# Patient Record
Sex: FEMALE | Race: OTHER | Employment: PART TIME | URBAN - METROPOLITAN AREA
[De-identification: names, ages, dates, MRNs, and addresses within clinical notes are randomized per-mention and may not be internally consistent; named-entity substitution may affect disease eponyms.]

---

## 2017-01-04 ENCOUNTER — TELEPHONE (OUTPATIENT)
Dept: FAMILY MEDICINE CLINIC | Age: 42
End: 2017-01-04

## 2017-01-04 RX ORDER — LOSARTAN POTASSIUM AND HYDROCHLOROTHIAZIDE 25; 100 MG/1; MG/1
1 TABLET ORAL DAILY
Qty: 30 TAB | Refills: 5 | Status: SHIPPED | OUTPATIENT
Start: 2017-01-04 | End: 2017-08-02 | Stop reason: SDUPTHER

## 2017-01-04 NOTE — TELEPHONE ENCOUNTER
Pt called. Pt wants lisinopril changed to something different because of the side affects. Side affects cold for 3wks and rashes and swelling in tongue and made it feel funny. Call pt at [phone].

## 2017-01-04 NOTE — TELEPHONE ENCOUNTER
Please advise pt that alternate medication has been sent to her pharmacy on file, please follow up in 2-3 weeks for BP check due to medication change.

## 2017-07-28 RX ORDER — ALBUTEROL SULFATE 108 UG/1
AEROSOL, METERED RESPIRATORY (INHALATION)
Qty: 7 INHALER | Refills: 0 | Status: SHIPPED | OUTPATIENT
Start: 2017-07-28 | End: 2017-10-08 | Stop reason: SDUPTHER

## 2017-08-02 RX ORDER — ONDANSETRON 4 MG/1
TABLET, ORALLY DISINTEGRATING ORAL
Qty: 15 TAB | Refills: 0 | Status: SHIPPED | OUTPATIENT
Start: 2017-08-02 | End: 2017-10-08 | Stop reason: SDUPTHER

## 2017-08-02 RX ORDER — LOSARTAN POTASSIUM AND HYDROCHLOROTHIAZIDE 25; 100 MG/1; MG/1
TABLET ORAL
Qty: 30 TAB | Refills: 0 | Status: SHIPPED | OUTPATIENT
Start: 2017-08-02 | End: 2017-10-08 | Stop reason: SDUPTHER

## 2017-10-09 RX ORDER — LOSARTAN POTASSIUM AND HYDROCHLOROTHIAZIDE 25; 100 MG/1; MG/1
TABLET ORAL
Qty: 30 TAB | Refills: 0 | Status: SHIPPED | OUTPATIENT
Start: 2017-10-09 | End: 2018-04-25 | Stop reason: SDUPTHER

## 2017-10-09 RX ORDER — ALBUTEROL SULFATE 108 UG/1
AEROSOL, METERED RESPIRATORY (INHALATION)
Qty: 6 INHALER | Refills: 0 | Status: SHIPPED | OUTPATIENT
Start: 2017-10-09 | End: 2018-04-25 | Stop reason: SDUPTHER

## 2017-10-10 RX ORDER — ONDANSETRON 4 MG/1
TABLET, ORALLY DISINTEGRATING ORAL
Qty: 15 TAB | Refills: 0 | Status: SHIPPED | OUTPATIENT
Start: 2017-10-10 | End: 2018-04-25 | Stop reason: ALTCHOICE

## 2018-04-25 ENCOUNTER — OFFICE VISIT (OUTPATIENT)
Dept: FAMILY MEDICINE CLINIC | Age: 43
End: 2018-04-25

## 2018-04-25 VITALS
OXYGEN SATURATION: 97 % | RESPIRATION RATE: 18 BRPM | HEIGHT: 62 IN | WEIGHT: 293 LBS | BODY MASS INDEX: 53.92 KG/M2 | HEART RATE: 85 BPM | TEMPERATURE: 97.9 F | SYSTOLIC BLOOD PRESSURE: 170 MMHG | DIASTOLIC BLOOD PRESSURE: 95 MMHG

## 2018-04-25 DIAGNOSIS — R11.0 NAUSEA: ICD-10-CM

## 2018-04-25 DIAGNOSIS — F41.9 ANXIETY: ICD-10-CM

## 2018-04-25 DIAGNOSIS — Z12.39 SCREENING FOR MALIGNANT NEOPLASM OF BREAST: ICD-10-CM

## 2018-04-25 DIAGNOSIS — I10 ESSENTIAL HYPERTENSION: Primary | ICD-10-CM

## 2018-04-25 DIAGNOSIS — M79.7 FIBROMYALGIA: ICD-10-CM

## 2018-04-25 DIAGNOSIS — G47.00 INSOMNIA, UNSPECIFIED TYPE: ICD-10-CM

## 2018-04-25 PROBLEM — E66.01 OBESITY, MORBID (HCC): Status: ACTIVE | Noted: 2018-04-25

## 2018-04-25 RX ORDER — CYCLOBENZAPRINE HCL 10 MG
10 TABLET ORAL
Qty: 90 TAB | Refills: 1 | Status: SHIPPED | OUTPATIENT
Start: 2018-04-25 | End: 2019-05-07 | Stop reason: SDUPTHER

## 2018-04-25 RX ORDER — ALBUTEROL SULFATE 90 UG/1
AEROSOL, METERED RESPIRATORY (INHALATION)
Qty: 6 INHALER | Refills: 2 | Status: SHIPPED | OUTPATIENT
Start: 2018-04-25 | End: 2019-05-07 | Stop reason: SDUPTHER

## 2018-04-25 RX ORDER — LORAZEPAM 0.5 MG/1
0.5 TABLET ORAL
Qty: 45 TAB | Refills: 2 | Status: SHIPPED | OUTPATIENT
Start: 2018-04-25 | End: 2019-05-07 | Stop reason: SDUPTHER

## 2018-04-25 RX ORDER — LOSARTAN POTASSIUM AND HYDROCHLOROTHIAZIDE 25; 100 MG/1; MG/1
TABLET ORAL
Qty: 90 TAB | Refills: 3 | Status: SHIPPED | OUTPATIENT
Start: 2018-04-25 | End: 2019-05-07 | Stop reason: SDUPTHER

## 2018-04-25 RX ORDER — IBUPROFEN 800 MG/1
800 TABLET ORAL
Qty: 90 TAB | Refills: 1 | Status: SHIPPED | OUTPATIENT
Start: 2018-04-25 | End: 2019-05-07 | Stop reason: SDUPTHER

## 2018-04-25 RX ORDER — DULOXETIN HYDROCHLORIDE 60 MG/1
60 CAPSULE, DELAYED RELEASE ORAL 2 TIMES DAILY
COMMUNITY
End: 2018-04-25 | Stop reason: SDUPTHER

## 2018-04-25 RX ORDER — ONDANSETRON 4 MG/1
TABLET, ORALLY DISINTEGRATING ORAL
Qty: 15 TAB | Refills: 2 | Status: SHIPPED | OUTPATIENT
Start: 2018-04-25 | End: 2018-10-17 | Stop reason: SDUPTHER

## 2018-04-25 RX ORDER — DULOXETIN HYDROCHLORIDE 60 MG/1
60 CAPSULE, DELAYED RELEASE ORAL 2 TIMES DAILY
Qty: 180 CAP | Refills: 3 | Status: SHIPPED | OUTPATIENT
Start: 2018-04-25 | End: 2019-05-07 | Stop reason: SDUPTHER

## 2018-04-25 NOTE — MR AVS SNAPSHOT
[address] [address] 
[phone] Patient: Yaneth Marley MRN: [id number] :1975 Visit Information Date & Time Provider Department Dept. Phone Encounter #  
 2018 10:20 AM Jordana Aguilar MD [address] [facility] [phone] [id number] Upcoming Health Maintenance Date Due DTaP/Tdap/Td series (1 - Tdap) 1995 PAP AKA CERVICAL CYTOLOGY 2018 Influenza Age 5 to Adult 2018* *Topic was postponed. The date shown is not the original due date. Allergies as of 2018  Review Complete On: 2018 By: Jordana Aguilar MD  
 No Known Allergies Current Immunizations  Reviewed on 3/21/2012 Name Date PPD 3/21/2012 Not reviewed this visit You Were Diagnosed With   
  
 Codes Comments Essential hypertension    -  Primary ICD-10-CM: I10 
ICD-9-CM: 401.9 Insomnia, unspecified type     ICD-10-CM: G47.00 ICD-9-CM: 780.52 Anxiety     ICD-10-CM: F41.9 ICD-9-CM: 300.00 Fibromyalgia     ICD-10-CM: M79.7 ICD-9-CM: 729.1 Nausea     ICD-10-CM: R11.0 ICD-9-CM: 787.02 Screening for malignant neoplasm of breast     ICD-10-CM: Z12.31 
ICD-9-CM: V76.10 Vitals BP Pulse Temp Resp Height(growth percentile) Weight(growth percentile) (!) 170/95 (BP 1 Location: Left arm, BP Patient Position: Sitting) 85 97.9 °F (36.6 °C) (Oral) 18 5' 2\" (1.575 m) 310 lb (140.6 kg) LMP SpO2 BMI OB Status Smoking Status 2018 (Exact Date) 97% 56.7 kg/m2 Having regular periods Never Smoker Vitals History BMI and BSA Data Body Mass Index Body Surface Area 56.7 kg/m 2 2.48 m 2 Preferred Pharmacy Pharmacy Name Phone [facility] [address] [phone] Your Updated Medication List  
  
   
This list is accurate as of 18 11:11 AM.  Always use your most recent med list.  
  
  
  
  
 albuterol 90 mcg/actuation inhaler Commonly known as:  PROVENTIL HFA INHALE TWO PUFFS BY MOUTH EVERY 4 HOURS AS NEEDED FOR WHEEZING FOR  UP  TO  360  DAYS  
  
 cyclobenzaprine 10 mg tablet Commonly known as:  FLEXERIL Take 1 Tab by mouth nightly. DULoxetine 60 mg capsule Commonly known as:  CYMBALTA Take 1 Cap by mouth two (2) times a day. ibuprofen 800 mg tablet Commonly known as:  MOTRIN Take 1 Tab by mouth every eight (8) hours as needed for Pain. LORazepam 0.5 mg tablet Commonly known as:  ATIVAN Take 1 Tab by mouth two (2) times daily as needed for Anxiety (or sleep). Must last 30 days  
  
 losartan-hydroCHLOROthiazide 100-25 mg per tablet Commonly known as:  HYZAAR  
TAKE ONE TABLET BY MOUTH ONCE DAILY  
  
 ondansetron 4 mg disintegrating tablet Commonly known as:  ZOFRAN ODT  
DISSOLVE ONE TABLET ON TONGUE  EVERY 8 HOURS AS NEEDED Prescriptions Printed Refills LORazepam (ATIVAN) 0.5 mg tablet 2 Sig: Take 1 Tab by mouth two (2) times daily as needed for Anxiety (or sleep). Must last 30 days Class: Print Route: Oral  
  
Prescriptions Sent to Pharmacy Refills DULoxetine (CYMBALTA) 60 mg capsule 3 Sig: Take 1 Cap by mouth two (2) times a day. Class: Normal  
 Pharmacy: [facility] [address] Ph #: [phone] Route: Oral  
 losartan-hydroCHLOROthiazide (HYZAAR) 100-25 mg per tablet 3 Sig: TAKE ONE TABLET BY MOUTH ONCE DAILY Class: Normal  
 Pharmacy: [facility] [address] Ph #: [phone]  
 ibuprofen (MOTRIN) 800 mg tablet 1 Sig: Take 1 Tab by mouth every eight (8) hours as needed for Pain. Class: Normal  
 Pharmacy: [facility] [address] Ph #: [phone]  Route: Oral  
 albuterol (PROVENTIL HFA) 90 mcg/actuation inhaler 2  
 Sig: INHALE TWO PUFFS BY MOUTH EVERY 4 HOURS AS NEEDED FOR WHEEZING FOR  UP  TO  360  DAYS Class: Normal  
 Pharmacy: [facility] [address] CAROLINE MIKE [facility], [address] [place] [address] Ph #: [phone]  
 cyclobenzaprine (FLEXERIL) 10 mg tablet 1 Sig: Take 1 Tab by mouth nightly. Class: Normal  
 Pharmacy: [facility] [address] CAR[address] MIKE [facility], [address] [place] [address] Ph #: [phone] Route: Oral  
 ondansetron (ZOFRAN ODT) 4 mg disintegrating tablet 2 Sig: DISSOLVE ONE TABLET ON TONGUE  EVERY 8 HOURS AS NEEDED Class: Normal  
 Pharmacy: [facility] [address] CAR[address] MIKE [facility], [address] [place] [address] Ph #: [phone] We Performed the Following CBC WITH AUTOMATED DIFF [91599 CPT(R)] HEMOGLOBIN A1C WITH EAG [16725 CPT(R)] LIPID PANEL [67842 CPT(R)] METABOLIC PANEL, COMPREHENSIVE [51677 CPT(R)] TSH 3RD GENERATION [04344 CPT(R)] VITAMIN D, 25 HYDROXY O2014347 CPT(R)] To-Do List   
 04/25/2018 Imaging:  [facility] MAMMO BI SCREENING INCL CAD Patient Instructions Body Mass Index: Care Instructions Your Care Instructions Body mass index (BMI) can help you see if your weight is raising your risk for health problems. It uses a formula to compare how much you weigh with how tall you are. · A BMI lower than 18.5 is considered underweight. · A BMI between 18.5 and 24.9 is considered healthy. · A BMI between 25 and 29.9 is considered overweight. A BMI of 30 or higher is considered obese. If your BMI is in the normal range, it means that you have a lower risk for weight-related health problems. If your BMI is in the overweight or obese range, you may be at increased risk for weight-related health problems, such as high blood pressure, heart disease, stroke, arthritis or joint pain, and diabetes.  If your BMI is in the underweight range, you may be at increased risk for health problems such as fatigue, lower protection (immunity) against illness, muscle loss, bone loss, hair loss, and hormone problems. BMI is just one measure of your risk for weight-related health problems. You may be at higher risk for health problems if you are not active, you eat an unhealthy diet, or you drink too much alcohol or use tobacco products. Follow-up care is a key part of your treatment and safety. Be sure to make and go to all appointments, and call your doctor if you are having problems. It's also a good idea to know your test results and keep a list of the medicines you take. How can you care for yourself at home? · Practice healthy eating habits. This includes eating plenty of fruits, vegetables, whole grains, lean protein, and low-fat dairy. · If your doctor recommends it, get more exercise. Walking is a good choice. Bit by bit, increase the amount you walk every day. Try for at least 30 minutes on most days of the week. · Do not smoke. Smoking can increase your risk for health problems. If you need help quitting, talk to your doctor about stop-smoking programs and medicines. These can increase your chances of quitting for good. · Limit alcohol to 2 drinks a day for men and 1 drink a day for women. Too much alcohol can cause health problems. If you have a BMI higher than 25 · Your doctor may do other tests to check your risk for weight-related health problems. This may include measuring the distance around your waist. A waist measurement of more than 40 inches in men or 35 inches in women can increase the risk of weight-related health problems. · Talk with your doctor about steps you can take to stay healthy or improve your health. You may need to make lifestyle changes to lose weight and stay healthy, such as changing your diet and getting regular exercise. If you have a BMI lower than 18.5 · Your doctor may do other tests to check your risk for health problems.  
· Talk with your doctor about steps you can take to stay healthy or improve your health. You may need to make lifestyle changes to gain or maintain weight and stay healthy, such as getting more healthy foods in your diet and doing exercises to build muscle. Where can you learn more? Go to http://britany-mike.info/. Enter S176 in the search box to learn more about \"Body Mass Index: Care Instructions. \" Current as of: October 13, 2016 Content Version: 11.4 © 5894-9035 [employer]. Care instructions adapted under license by [employer] (which disclaims liability or warranty for this information). If you have questions about a medical condition or this instruction, always ask your healthcare professional. [address] any warranty or liability for your use of this information. Introducing hospitals & HEALTH SERVICES! [facility] introduces [employer] patient portal. Now you can access parts of your medical record, email your doctor's office, and request medication refills online. 1. In your internet browser, go to [email or web address] 2. Click on the First Time User? Click Here link in the Sign In box. You will see the New Member Sign Up page. 3. Enter your [employer] Access Code exactly as it appears below. You will not need to use this code after youve completed the sign-up process. If you do not sign up before the expiration date, you must request a new code. · [employer] Access Code: [address] Expires: 7/24/2018 11:11 AM 
 
4. Enter the last four digits of your Social Security Number (xxxx) and Date of Birth (mm/dd/yyyy) as indicated and click Submit. You will be taken to the next sign-up page. 5. Create a [employer] ID. This will be your [employer] login ID and cannot be changed, so think of one that is secure and easy to remember. 6. Create a [employer] password. You can change your password at any time. 7. Enter your Password Reset Question and Answer.  This can be used at a later time if you forget your password. 8. Enter your e-mail address. You will receive e-mail notification when new information is available in [address] 9. Click Sign Up. You can now view and download portions of your medical record. 10. Click the Download Summary menu link to download a portable copy of your medical information. If you have questions, please visit the Frequently Asked Questions section of the [employer] website. Remember, [employer] is NOT to be used for urgent needs. For medical emergencies, dial 911. Now available from your iPhone and Android! Please provide this summary of care documentation to your next provider. Your primary care clinician is listed as MOISÉS JOE. If you have any questions after today's visit, please call [phone].

## 2018-04-25 NOTE — PATIENT INSTRUCTIONS
Body Mass Index: Care Instructions  Your Care Instructions    Body mass index (BMI) can help you see if your weight is raising your risk for health problems. It uses a formula to compare how much you weigh with how tall you are. · A BMI lower than 18.5 is considered underweight. · A BMI between 18.5 and 24.9 is considered healthy. · A BMI between 25 and 29.9 is considered overweight. A BMI of 30 or higher is considered obese. If your BMI is in the normal range, it means that you have a lower risk for weight-related health problems. If your BMI is in the overweight or obese range, you may be at increased risk for weight-related health problems, such as high blood pressure, heart disease, stroke, arthritis or joint pain, and diabetes. If your BMI is in the underweight range, you may be at increased risk for health problems such as fatigue, lower protection (immunity) against illness, muscle loss, bone loss, hair loss, and hormone problems. BMI is just one measure of your risk for weight-related health problems. You may be at higher risk for health problems if you are not active, you eat an unhealthy diet, or you drink too much alcohol or use tobacco products. Follow-up care is a key part of your treatment and safety. Be sure to make and go to all appointments, and call your doctor if you are having problems. It's also a good idea to know your test results and keep a list of the medicines you take. How can you care for yourself at home? · Practice healthy eating habits. This includes eating plenty of fruits, vegetables, whole grains, lean protein, and low-fat dairy. · If your doctor recommends it, get more exercise. Walking is a good choice. Bit by bit, increase the amount you walk every day. Try for at least 30 minutes on most days of the week. · Do not smoke. Smoking can increase your risk for health problems. If you need help quitting, talk to your doctor about stop-smoking programs and medicines. These can increase your chances of quitting for good. · Limit alcohol to 2 drinks a day for men and 1 drink a day for women. Too much alcohol can cause health problems. If you have a BMI higher than 25  · Your doctor may do other tests to check your risk for weight-related health problems. This may include measuring the distance around your waist. A waist measurement of more than 40 inches in men or 35 inches in women can increase the risk of weight-related health problems. · Talk with your doctor about steps you can take to stay healthy or improve your health. You may need to make lifestyle changes to lose weight and stay healthy, such as changing your diet and getting regular exercise. If you have a BMI lower than 18.5  · Your doctor may do other tests to check your risk for health problems. · Talk with your doctor about steps you can take to stay healthy or improve your health. You may need to make lifestyle changes to gain or maintain weight and stay healthy, such as getting more healthy foods in your diet and doing exercises to build muscle. Where can you learn more? Go to http://britany-mike.info/. Enter S176 in the search box to learn more about \"Body Mass Index: Care Instructions. \"  Current as of: October 13, 2016  Content Version: 11.4  © 8131-5581 [employer]wise[employer]. Care instructions adapted under license by [employer] (which disclaims liability or warranty for this information). If you have questions about a medical condition or this instruction, always ask your healthcare professional. [address] any warranty or liability for your use of this information.

## 2018-04-25 NOTE — PROGRESS NOTES
Pt here requesting refills on all RXs. Reports she has been off of BP medication since October. Pt is fasting this AM for lab work. C/o ongoing back pain x several years. Pt reports Cymbalta helps with back and diffuse joint pain. Subjective: (As above and below)     Chief Complaint   Patient presents with    Medication Refill     she is a 36y.o. year old female who presents for evaluation. Reviewed PmHx, RxHx, FmHx, SocHx, AllgHx and updated in chart. Review of Systems - negative except as listed above    Objective:     Vitals:    04/25/18 1037   BP: (!) 170/95   Pulse: 85   Resp: 18   Temp: 97.9 °F (36.6 °C)   TempSrc: Oral   SpO2: 97%   Weight: 310 lb (140.6 kg)   Height: 5' 2\" (1.575 m)     Physical Examination: General appearance - alert, well appearing, and in no distress  Mental status - normal mood, behavior, speech, dress, motor activity, and thought processes  Mouth - mucous membranes moist, pharynx normal without lesions  Chest - clear to auscultation, no wheezes, rales or rhonchi, symmetric air entry  Heart - normal rate, regular rhythm, normal S1, S2, no murmurs, rubs, clicks or gallops  Musculoskeletal - no joint tenderness, deformity or swelling  Extremities - peripheral pulses normal, no pedal edema, no clubbing or cyanosis    Assessment/ Plan:   1. Insomnia, unspecified type  -well controlled, reviewed as needed use  - LORazepam (ATIVAN) 0.5 mg tablet; Take 1 Tab by mouth two (2) times daily as needed for Anxiety (or sleep). Must last 30 days  Dispense: 45 Tab; Refill: 2    2. Anxiety  -stable  - LORazepam (ATIVAN) 0.5 mg tablet; Take 1 Tab by mouth two (2) times daily as needed for Anxiety (or sleep). Must last 30 days  Dispense: 45 Tab; Refill: 2    3. Fibromyalgia  - DULoxetine (CYMBALTA) 60 mg capsule; Take 1 Cap by mouth two (2) times a day. Dispense: 180 Cap; Refill: 3  - ibuprofen (MOTRIN) 800 mg tablet; Take 1 Tab by mouth every eight (8) hours as needed for Pain.   Dispense: 90 Tab; Refill: 1  - cyclobenzaprine (FLEXERIL) 10 mg tablet; Take 1 Tab by mouth nightly. Dispense: 90 Tab; Refill: 1    4. Essential hypertension  -RESUME MEDICATION IMMEDIATELY   - losartan-hydroCHLOROthiazide (HYZAAR) 100-25 mg per tablet; TAKE ONE TABLET BY MOUTH ONCE DAILY  Dispense: 90 Tab; Refill: 3  - METABOLIC PANEL, COMPREHENSIVE  - CBC WITH AUTOMATED DIFF  - LIPID PANEL  - TSH 3RD GENERATION  - HEMOGLOBIN A1C WITH EAG  - VITAMIN D, 25 HYDROXY    5. Nausea  - ondansetron (ZOFRAN ODT) 4 mg disintegrating tablet; DISSOLVE ONE TABLET ON TONGUE  EVERY 8 HOURS AS NEEDED  Dispense: 15 Tab; Refill: 2    6. Screening for malignant neoplasm of breast  - [facility] MAMMOGRAM SCREENING DIGITAL BILAT; Future     Follow-up Disposition: As needed  I have discussed the diagnosis with the patient and the intended plan as seen in the above orders. The patient has received an after-visit summary and questions were answered concerning future plans.      Medication Side Effects and Warnings were discussed with patient: yes  Patient Labs were reviewed: yes  Patient Past Records were reviewed:  yes    Mesfin Wilcox M.D.

## 2018-04-26 LAB
25(OH)D3+25(OH)D2 SERPL-MCNC: 14.8 NG/ML (ref 30–100)
ALBUMIN SERPL-MCNC: 3.7 G/DL (ref 3.5–5.5)
ALBUMIN/GLOB SERPL: 1.2 {RATIO} (ref 1.2–2.2)
ALP SERPL-CCNC: 79 IU/L (ref 39–117)
ALT SERPL-CCNC: 13 IU/L (ref 0–32)
AST SERPL-CCNC: 12 IU/L (ref 0–40)
BASOPHILS # BLD AUTO: 0 X10E3/UL (ref 0–0.2)
BASOPHILS NFR BLD AUTO: 0 %
BILIRUB SERPL-MCNC: 0.2 MG/DL (ref 0–1.2)
BUN SERPL-MCNC: 9 MG/DL (ref 6–24)
BUN/CREAT SERPL: 15 (ref 9–23)
CALCIUM SERPL-MCNC: 9.1 MG/DL (ref 8.7–10.2)
CHLORIDE SERPL-SCNC: 99 MMOL/L (ref 96–106)
CHOLEST SERPL-MCNC: 176 MG/DL (ref 100–199)
CO2 SERPL-SCNC: 26 MMOL/L (ref 18–29)
CREAT SERPL-MCNC: 0.61 MG/DL (ref 0.57–1)
EOSINOPHIL # BLD AUTO: 0.2 X10E3/UL (ref 0–0.4)
EOSINOPHIL NFR BLD AUTO: 2 %
ERYTHROCYTE [DISTWIDTH] IN BLOOD BY AUTOMATED COUNT: 16.6 % (ref 12.3–15.4)
EST. AVERAGE GLUCOSE BLD GHB EST-MCNC: 126 MG/DL
GFR SERPLBLD CREATININE-BSD FMLA CKD-EPI: 111 ML/MIN/1.73
GFR SERPLBLD CREATININE-BSD FMLA CKD-EPI: 128 ML/MIN/1.73
GLOBULIN SER CALC-MCNC: 3.1 G/DL (ref 1.5–4.5)
GLUCOSE SERPL-MCNC: 101 MG/DL (ref 65–99)
HBA1C MFR BLD: 6 % (ref 4.8–5.6)
HCT VFR BLD AUTO: 38.4 % (ref 34–46.6)
HDLC SERPL-MCNC: 48 MG/DL
HGB BLD-MCNC: 12 G/DL (ref 11.1–15.9)
IMM GRANULOCYTES # BLD: 0 X10E3/UL (ref 0–0.1)
IMM GRANULOCYTES NFR BLD: 0 %
INTERPRETATION, 910389: NORMAL
LDLC SERPL CALC-MCNC: 103 MG/DL (ref 0–99)
LYMPHOCYTES # BLD AUTO: 3.7 X10E3/UL (ref 0.7–3.1)
LYMPHOCYTES NFR BLD AUTO: 37 %
MCH RBC QN AUTO: 23.7 PG (ref 26.6–33)
MCHC RBC AUTO-ENTMCNC: 31.3 G/DL (ref 31.5–35.7)
MCV RBC AUTO: 76 FL (ref 79–97)
MONOCYTES # BLD AUTO: 0.7 X10E3/UL (ref 0.1–0.9)
MONOCYTES NFR BLD AUTO: 8 %
NEUTROPHILS # BLD AUTO: 5.2 X10E3/UL (ref 1.4–7)
NEUTROPHILS NFR BLD AUTO: 53 %
PLATELET # BLD AUTO: 347 X10E3/UL (ref 150–379)
POTASSIUM SERPL-SCNC: 4.6 MMOL/L (ref 3.5–5.2)
PROT SERPL-MCNC: 6.8 G/DL (ref 6–8.5)
RBC # BLD AUTO: 5.06 X10E6/UL (ref 3.77–5.28)
SODIUM SERPL-SCNC: 139 MMOL/L (ref 134–144)
TRIGL SERPL-MCNC: 126 MG/DL (ref 0–149)
TSH SERPL DL<=0.005 MIU/L-ACNC: 1.02 UIU/ML (ref 0.45–4.5)
VLDLC SERPL CALC-MCNC: 25 MG/DL (ref 5–40)
WBC # BLD AUTO: 9.8 X10E3/UL (ref 3.4–10.8)

## 2018-04-26 NOTE — PROGRESS NOTES
Increase in risk for diabetes, please closely monitor diet and increase exercise   Vitamin D is slightly low, please take a daily supplement of at least 2000 IU (international units) daily. All other labs are within normal limits. Recheck in 6 months  Please inform.

## 2018-05-01 ENCOUNTER — DOCUMENTATION ONLY (OUTPATIENT)
Dept: FAMILY MEDICINE CLINIC | Age: 43
End: 2018-05-01

## 2018-07-22 DIAGNOSIS — G47.00 INSOMNIA, UNSPECIFIED TYPE: ICD-10-CM

## 2018-07-22 DIAGNOSIS — F41.9 ANXIETY: ICD-10-CM

## 2018-07-22 RX ORDER — LORAZEPAM 0.5 MG/1
TABLET ORAL
Qty: 45 TAB | Refills: 0 | OUTPATIENT
Start: 2018-07-22

## 2018-10-17 DIAGNOSIS — R11.0 NAUSEA: ICD-10-CM

## 2018-10-17 RX ORDER — ONDANSETRON 4 MG/1
TABLET, ORALLY DISINTEGRATING ORAL
Qty: 15 TAB | Refills: 2 | Status: SHIPPED | OUTPATIENT
Start: 2018-10-17 | End: 2019-03-12 | Stop reason: SDUPTHER

## 2019-03-12 DIAGNOSIS — R11.0 NAUSEA: ICD-10-CM

## 2019-03-12 RX ORDER — ONDANSETRON 4 MG/1
TABLET, ORALLY DISINTEGRATING ORAL
Qty: 15 TAB | Refills: 2 | Status: SHIPPED | OUTPATIENT
Start: 2019-03-12 | End: 2019-05-07 | Stop reason: SDUPTHER

## 2019-04-18 ENCOUNTER — DOCUMENTATION ONLY (OUTPATIENT)
Dept: FAMILY MEDICINE CLINIC | Age: 44
End: 2019-04-18

## 2019-05-07 ENCOUNTER — OFFICE VISIT (OUTPATIENT)
Dept: FAMILY MEDICINE CLINIC | Age: 44
End: 2019-05-07

## 2019-05-07 VITALS
OXYGEN SATURATION: 98 % | RESPIRATION RATE: 18 BRPM | BODY MASS INDEX: 53.92 KG/M2 | HEART RATE: 87 BPM | WEIGHT: 293 LBS | DIASTOLIC BLOOD PRESSURE: 100 MMHG | SYSTOLIC BLOOD PRESSURE: 158 MMHG | HEIGHT: 62 IN | TEMPERATURE: 98.2 F

## 2019-05-07 DIAGNOSIS — F41.9 ANXIETY: ICD-10-CM

## 2019-05-07 DIAGNOSIS — R73.01 IMPAIRED FASTING BLOOD SUGAR: ICD-10-CM

## 2019-05-07 DIAGNOSIS — Z12.31 VISIT FOR SCREENING MAMMOGRAM: ICD-10-CM

## 2019-05-07 DIAGNOSIS — R11.0 NAUSEA: ICD-10-CM

## 2019-05-07 DIAGNOSIS — I10 ESSENTIAL HYPERTENSION: ICD-10-CM

## 2019-05-07 DIAGNOSIS — M79.7 FIBROMYALGIA: ICD-10-CM

## 2019-05-07 DIAGNOSIS — G47.00 INSOMNIA, UNSPECIFIED TYPE: ICD-10-CM

## 2019-05-07 DIAGNOSIS — Z00.00 WELL ADULT EXAM: Primary | ICD-10-CM

## 2019-05-07 RX ORDER — LOSARTAN POTASSIUM AND HYDROCHLOROTHIAZIDE 25; 100 MG/1; MG/1
TABLET ORAL
Qty: 90 TAB | Refills: 3 | Status: SHIPPED | OUTPATIENT
Start: 2019-05-07 | End: 2020-07-09 | Stop reason: SDUPTHER

## 2019-05-07 RX ORDER — CYCLOBENZAPRINE HCL 10 MG
10 TABLET ORAL
Qty: 90 TAB | Refills: 1 | Status: SHIPPED | OUTPATIENT
Start: 2019-05-07 | End: 2020-07-09 | Stop reason: SDUPTHER

## 2019-05-07 RX ORDER — ONDANSETRON 4 MG/1
TABLET, ORALLY DISINTEGRATING ORAL
Qty: 15 TAB | Refills: 2 | Status: SHIPPED | OUTPATIENT
Start: 2019-05-07 | End: 2019-12-04 | Stop reason: SDUPTHER

## 2019-05-07 RX ORDER — DULOXETIN HYDROCHLORIDE 60 MG/1
60 CAPSULE, DELAYED RELEASE ORAL 2 TIMES DAILY
Qty: 180 CAP | Refills: 3 | Status: SHIPPED | OUTPATIENT
Start: 2019-05-07 | End: 2019-07-30 | Stop reason: SDUPTHER

## 2019-05-07 RX ORDER — ALBUTEROL SULFATE 90 UG/1
AEROSOL, METERED RESPIRATORY (INHALATION)
Qty: 6 INHALER | Refills: 2 | Status: SHIPPED | OUTPATIENT
Start: 2019-05-07 | End: 2020-05-21 | Stop reason: SDUPTHER

## 2019-05-07 RX ORDER — IBUPROFEN 800 MG/1
800 TABLET ORAL
Qty: 90 TAB | Refills: 1 | Status: SHIPPED | OUTPATIENT
Start: 2019-05-07 | End: 2020-07-09 | Stop reason: SDUPTHER

## 2019-05-07 RX ORDER — LORAZEPAM 1 MG/1
1 TABLET ORAL
Qty: 45 TAB | Refills: 2 | Status: SHIPPED | OUTPATIENT
Start: 2019-05-07 | End: 2019-12-04 | Stop reason: SDUPTHER

## 2019-05-07 NOTE — PROGRESS NOTES
Chief Complaint   Patient presents with    Medication Refill     Pt in office today for med refill    Pt has paperwork for work that needs to be filled out  -pt states bc of her fibromyalgia she has been missing a lot of work    Pt has no other concerns

## 2019-05-07 NOTE — PROGRESS NOTES
Subjective:   39 y.o. female for Well Woman Check. Her gyne and breast care is done elsewhere by her Ob-Gyne physician. Patient Active Problem List    Diagnosis Date Noted    Obesity, morbid ([address] 04/25/2018    Chronic pain     Obesity 08/18/2010    Fibromyalgia 08/18/2010    HTN (hypertension) 08/18/2010     Current Outpatient Medications   Medication Sig Dispense Refill    albuterol (PROVENTIL HFA) 90 mcg/actuation inhaler INHALE TWO PUFFS BY MOUTH EVERY 4 HOURS AS NEEDED FOR WHEEZING FOR  UP  TO  360  DAYS 6 Inhaler 2    cyclobenzaprine (FLEXERIL) 10 mg tablet Take 1 Tab by mouth nightly. 90 Tab 1    DULoxetine (CYMBALTA) 60 mg capsule Take 1 Cap by mouth two (2) times a day. 180 Cap 3    ibuprofen (MOTRIN) 800 mg tablet Take 1 Tab by mouth every eight (8) hours as needed for Pain. 90 Tab 1    LORazepam (ATIVAN) 1 mg tablet Take 1 Tab by mouth two (2) times daily as needed for Anxiety (or sleep). Max Daily Amount: 2 mg. Must last 30 days 45 Tab 2    losartan-hydroCHLOROthiazide (HYZAAR) 100-25 mg per tablet TAKE ONE TABLET BY MOUTH ONCE DAILY 90 Tab 3    ondansetron (ZOFRAN ODT) 4 mg disintegrating tablet DISSOLVE ONE TABLET ON TONGUE  EVERY 8 HOURS AS NEEDED 15 Tab 2     Family History   Problem Relation Age of Onset    Elevated Lipids Mother     Arthritis-rheumatoid Mother      Social History     Tobacco Use    Smoking status: Never Smoker    Smokeless tobacco: Never Used   Substance Use Topics    Alcohol use: No        ROS: Feeling generally well. No TIA's or unusual headaches, no dysphagia. No prolonged cough. No dyspnea or chest pain on exertion. No abdominal pain, change in bowel habits, black or bloody stools. No urinary tract symptoms. No new or unusual musculoskeletal symptoms. Specific concerns today: needs refills of all meds, out of bp med x 4 days. Objective: The patient appears well, alert, oriented x 3, in no distress.   Visit Vitals  BP (!) 158/100 (BP 1 Location: Left arm, BP Patient Position: Sitting)   Pulse 87   Temp 98.2 °F (36.8 °C) (Oral)   Resp 18   Ht 5' 2\" (1.575 m)   Wt 326 lb (147.9 kg)   LMP 04/23/2019   SpO2 98%   BMI 59.63 kg/m²     ENT normal.  Neck supple. No adenopathy or thyromegaly. JONATHON. Lungs are clear, good air entry, no wheezes, rhonchi or rales. S1 and S2 normal, no murmurs, regular rate and rhythm. Abdomen soft without tenderness, guarding, mass or organomegaly. Extremities show no edema, normal peripheral pulses. Neurological is normal, no focal findings. Breast and Pelvic exams are deferred. Assessment/Plan:   Well Woman  lose weight, increase physical activity, follow low fat diet, follow low salt diet, routine labs ordered  Encounter Diagnoses   Name Primary?  Well adult exam Yes    Fibromyalgia     Insomnia, unspecified type     Anxiety     Essential hypertension     Nausea     Impaired fasting blood sugar     Visit for screening mammogram      Orders Placed This Encounter    MK MAMMO BI SCREENING INCL CAD    LIPID PANEL    CBC WITH AUTOMATED DIFF    METABOLIC PANEL, COMPREHENSIVE    TSH 3RD GENERATION    HEMOGLOBIN A1C WITH EAG    albuterol (PROVENTIL HFA) 90 mcg/actuation inhaler    cyclobenzaprine (FLEXERIL) 10 mg tablet    DULoxetine (CYMBALTA) 60 mg capsule    ibuprofen (MOTRIN) 800 mg tablet    LORazepam (ATIVAN) 1 mg tablet    losartan-hydroCHLOROthiazide (HYZAAR) 100-25 mg per tablet    ondansetron (ZOFRAN ODT) 4 mg disintegrating tablet     I have discussed the diagnosis with the patient and the intended plan as seen in the above orders. The patient has received an after-visit summary and questions were answered concerning future plans. Patient conveyed understanding of the plan at the time of the visit.     Fam Castañeda, MSN, ANP  5/7/2019

## 2019-05-08 LAB
ALBUMIN SERPL-MCNC: 3.8 G/DL (ref 3.5–5.5)
ALBUMIN/GLOB SERPL: 1.2 {RATIO} (ref 1.2–2.2)
ALP SERPL-CCNC: 78 IU/L (ref 39–117)
ALT SERPL-CCNC: 12 IU/L (ref 0–32)
AST SERPL-CCNC: 12 IU/L (ref 0–40)
BASOPHILS # BLD AUTO: 0 X10E3/UL (ref 0–0.2)
BASOPHILS NFR BLD AUTO: 0 %
BILIRUB SERPL-MCNC: <0.2 MG/DL (ref 0–1.2)
BUN SERPL-MCNC: 8 MG/DL (ref 6–24)
BUN/CREAT SERPL: 15 (ref 9–23)
CALCIUM SERPL-MCNC: 8.6 MG/DL (ref 8.7–10.2)
CHLORIDE SERPL-SCNC: 100 MMOL/L (ref 96–106)
CHOLEST SERPL-MCNC: 156 MG/DL (ref 100–199)
CO2 SERPL-SCNC: 24 MMOL/L (ref 20–29)
CREAT SERPL-MCNC: 0.55 MG/DL (ref 0.57–1)
EOSINOPHIL # BLD AUTO: 0.2 X10E3/UL (ref 0–0.4)
EOSINOPHIL NFR BLD AUTO: 2 %
ERYTHROCYTE [DISTWIDTH] IN BLOOD BY AUTOMATED COUNT: 17.8 % (ref 12.3–15.4)
EST. AVERAGE GLUCOSE BLD GHB EST-MCNC: 128 MG/DL
GLOBULIN SER CALC-MCNC: 3.2 G/DL (ref 1.5–4.5)
GLUCOSE SERPL-MCNC: 101 MG/DL (ref 65–99)
HBA1C MFR BLD: 6.1 % (ref 4.8–5.6)
HCT VFR BLD AUTO: 35.8 % (ref 34–46.6)
HDLC SERPL-MCNC: 48 MG/DL
HGB BLD-MCNC: 10.9 G/DL (ref 11.1–15.9)
IMM GRANULOCYTES # BLD AUTO: 0 X10E3/UL (ref 0–0.1)
IMM GRANULOCYTES NFR BLD AUTO: 0 %
INTERPRETATION, 910389: NORMAL
LDLC SERPL CALC-MCNC: 87 MG/DL (ref 0–99)
LYMPHOCYTES # BLD AUTO: 4.9 X10E3/UL (ref 0.7–3.1)
LYMPHOCYTES NFR BLD AUTO: 50 %
MCH RBC QN AUTO: 22.2 PG (ref 26.6–33)
MCHC RBC AUTO-ENTMCNC: 30.4 G/DL (ref 31.5–35.7)
MCV RBC AUTO: 73 FL (ref 79–97)
MONOCYTES # BLD AUTO: 0.6 X10E3/UL (ref 0.1–0.9)
MONOCYTES NFR BLD AUTO: 6 %
NEUTROPHILS # BLD AUTO: 4.2 X10E3/UL (ref 1.4–7)
NEUTROPHILS NFR BLD AUTO: 42 %
PLATELET # BLD AUTO: 390 X10E3/UL (ref 150–379)
POTASSIUM SERPL-SCNC: 4.2 MMOL/L (ref 3.5–5.2)
PROT SERPL-MCNC: 7 G/DL (ref 6–8.5)
RBC # BLD AUTO: 4.9 X10E6/UL (ref 3.77–5.28)
SODIUM SERPL-SCNC: 140 MMOL/L (ref 134–144)
TRIGL SERPL-MCNC: 103 MG/DL (ref 0–149)
TSH SERPL DL<=0.005 MIU/L-ACNC: 1.76 UIU/ML (ref 0.45–4.5)
VLDLC SERPL CALC-MCNC: 21 MG/DL (ref 5–40)
WBC # BLD AUTO: 10 X10E3/UL (ref 3.4–10.8)

## 2019-07-30 DIAGNOSIS — M79.7 FIBROMYALGIA: ICD-10-CM

## 2019-07-30 RX ORDER — DULOXETIN HYDROCHLORIDE 60 MG/1
CAPSULE, DELAYED RELEASE ORAL
Qty: 180 CAP | Refills: 3 | Status: SHIPPED | OUTPATIENT
Start: 2019-07-30 | End: 2020-05-27 | Stop reason: SDUPTHER

## 2019-11-08 ENCOUNTER — ED HISTORICAL/CONVERTED ENCOUNTER (OUTPATIENT)
Dept: OTHER | Age: 44
End: 2019-11-08

## 2019-12-04 ENCOUNTER — OFFICE VISIT (OUTPATIENT)
Dept: FAMILY MEDICINE CLINIC | Age: 44
End: 2019-12-04

## 2019-12-04 VITALS
OXYGEN SATURATION: 97 % | RESPIRATION RATE: 18 BRPM | WEIGHT: 293 LBS | SYSTOLIC BLOOD PRESSURE: 123 MMHG | HEART RATE: 99 BPM | HEIGHT: 62 IN | DIASTOLIC BLOOD PRESSURE: 69 MMHG | TEMPERATURE: 98.5 F | BODY MASS INDEX: 53.92 KG/M2

## 2019-12-04 DIAGNOSIS — M50.30 DDD (DEGENERATIVE DISC DISEASE), CERVICAL: ICD-10-CM

## 2019-12-04 DIAGNOSIS — R11.0 NAUSEA: ICD-10-CM

## 2019-12-04 DIAGNOSIS — M62.830 BACK SPASM: ICD-10-CM

## 2019-12-04 DIAGNOSIS — F41.9 ANXIETY: ICD-10-CM

## 2019-12-04 DIAGNOSIS — G47.00 INSOMNIA, UNSPECIFIED TYPE: ICD-10-CM

## 2019-12-04 DIAGNOSIS — M54.50 CHRONIC BILATERAL LOW BACK PAIN WITHOUT SCIATICA: Primary | ICD-10-CM

## 2019-12-04 DIAGNOSIS — G89.29 CHRONIC BILATERAL LOW BACK PAIN WITHOUT SCIATICA: Primary | ICD-10-CM

## 2019-12-04 RX ORDER — PREDNISONE 10 MG/1
TABLET ORAL
Qty: 21 TAB | Refills: 0 | Status: SHIPPED | OUTPATIENT
Start: 2019-12-04 | End: 2021-05-05 | Stop reason: ALTCHOICE

## 2019-12-04 RX ORDER — DICLOFENAC SODIUM 75 MG/1
75 TABLET, DELAYED RELEASE ORAL 2 TIMES DAILY
Qty: 60 TAB | Refills: 2 | Status: SHIPPED | OUTPATIENT
Start: 2019-12-04 | End: 2020-07-09 | Stop reason: SDUPTHER

## 2019-12-04 RX ORDER — ONDANSETRON 4 MG/1
TABLET, ORALLY DISINTEGRATING ORAL
Qty: 15 TAB | Refills: 2 | Status: SHIPPED | OUTPATIENT
Start: 2019-12-04 | End: 2020-07-09 | Stop reason: SDUPTHER

## 2019-12-04 RX ORDER — LORAZEPAM 1 MG/1
1 TABLET ORAL
Qty: 45 TAB | Refills: 2 | Status: SHIPPED | OUTPATIENT
Start: 2019-12-04 | End: 2020-05-27 | Stop reason: SDUPTHER

## 2019-12-04 NOTE — PROGRESS NOTES
Chief Complaint   Patient presents with    Back Pain     Pt in office today for back pain  -pt states that she went back in October to urgent care for her back  -pt states this has been going on for years but got worse in October  -pt has concerns with her bp  -pt states that it has been elevated  -pt has not had losartan  1. Have you been to the ER, urgent care clinic since your last visit? Hospitalized since your last visit? Yes When: urgent care    2. Have you seen or consulted any other health care providers outside of the [address] [place] [address] since your last visit? Include any pap smears or colon screening.  No

## 2019-12-04 NOTE — PROGRESS NOTES
HISTORY OF PRESENT ILLNESS  Shu Zavaleta is a 38 y.o. female. HPI  Pt in office today for back pain  -pt states that she went back in October to urgent care for her back  -pt states this has been going on for years but got worse in October  She is now working at [employer], sits at desk all day and has to walk long distances through the parking lot    ROS  A comprehensive review of system was obtained and negative except findings in the HPI    Visit Vitals  /69 (BP 1 Location: Left arm, BP Patient Position: Sitting)   Pulse 99   Temp 98.5 °F (36.9 °C) (Oral)   Resp 18   Ht 5' 2\" (1.575 m)   Wt 346 lb (156.9 kg)   LMP 11/21/2019   SpO2 97%   BMI 63.28 kg/m²     Physical Exam  Vitals signs and nursing note reviewed. Constitutional:       Appearance: She is well-developed. Comments:      Neck:      Vascular: No JVD. Cardiovascular:      Rate and Rhythm: Normal rate and regular rhythm. Heart sounds: No murmur. No friction rub. No gallop. Pulmonary:      Effort: Pulmonary effort is normal. No respiratory distress. Breath sounds: Normal breath sounds. No wheezing. Skin:     General: Skin is warm. Neurological:      Mental Status: She is alert and oriented to person, place, and time. ASSESSMENT and PLAN  Encounter Diagnoses   Name Primary?     Chronic bilateral low back pain without sciatica Yes    Back spasm     DDD (degenerative disc disease), cervical     Nausea     Insomnia, unspecified type     Anxiety      Orders Placed This Encounter    predniSONE (STERAPRED DS) 10 mg dose pack    diclofenac EC (VOLTAREN) 75 mg EC tablet    ondansetron (ZOFRAN ODT) 4 mg disintegrating tablet    LORazepam (ATIVAN) 1 mg tablet     She was encouraged to walk as much as possible  Start pred pack and voltaren prn pain  Refilled regular maintenance meds as well  Can order PT if does not improve    I have discussed the diagnosis with the patient and the intended plan as seen in the above orders. The patient has received an after-visit summary and questions were answered concerning future plans. Patient conveyed understanding of the plan at the time of the visit.     Cinthia Bai, MSN, ANP  12/4/2019

## 2020-05-21 RX ORDER — ALBUTEROL SULFATE 90 UG/1
AEROSOL, METERED RESPIRATORY (INHALATION)
Qty: 6 INHALER | Refills: 2 | Status: SHIPPED | OUTPATIENT
Start: 2020-05-21 | End: 2020-07-09 | Stop reason: SDUPTHER

## 2020-05-27 ENCOUNTER — VIRTUAL VISIT (OUTPATIENT)
Dept: FAMILY MEDICINE CLINIC | Age: 45
End: 2020-05-27

## 2020-05-27 DIAGNOSIS — F41.9 ANXIETY: ICD-10-CM

## 2020-05-27 DIAGNOSIS — G47.00 INSOMNIA, UNSPECIFIED TYPE: ICD-10-CM

## 2020-05-27 DIAGNOSIS — M79.7 FIBROMYALGIA: ICD-10-CM

## 2020-05-27 RX ORDER — DULOXETIN HYDROCHLORIDE 60 MG/1
CAPSULE, DELAYED RELEASE ORAL
Qty: 60 CAP | Refills: 5 | Status: SHIPPED | OUTPATIENT
Start: 2020-05-27 | End: 2020-12-08 | Stop reason: SDUPTHER

## 2020-05-27 RX ORDER — LORAZEPAM 1 MG/1
1 TABLET ORAL
Qty: 45 TAB | Refills: 2 | Status: SHIPPED | OUTPATIENT
Start: 2020-05-27 | End: 2020-09-07

## 2020-05-27 NOTE — PROGRESS NOTES
Aureliano Brock is a 38 y.o. female who was seen by synchronous (real-time) audio-video technology on 5/27/2020. Consent: Aureliano Brock, who was seen by synchronous (real-time) audio-video technology, and/or her healthcare decision maker, is aware that this patient-initiated, Telehealth encounter on 5/27/2020 is a billable service, with coverage as determined by her insurance carrier. She is aware that she may receive a bill and has provided verbal consent to proceed: Yes. I spent at least 15 minutes on this visit with this established patient. Subjective:   Aureliano Brock is a 38 y.o. female who was seen for No chief complaint on file. she is requesting cymbalta and ativan refills  Takes scheduled every night and sometimes during the day  Feels overwhelmed caring for her mother    Prior to Admission medications    Medication Sig Start Date End Date Taking? Authorizing Provider   DULoxetine (CYMBALTA) 60 mg capsule TAKE 1 CAPSULE BY MOUTH TWICE A DAY 5/27/20  Yes Berny Arriaza NP   LORazepam (ATIVAN) 1 mg tablet Take 1 Tab by mouth two (2) times daily as needed for Anxiety (or sleep). Max Daily Amount: 2 mg. Must last 30 days 5/27/20  Yes Berny Arriaza NP   albuterol (Proventil HFA) 90 mcg/actuation inhaler INHALE TWO PUFFS BY MOUTH EVERY 4 HOURS AS NEEDED FOR WHEEZING FOR  UP  TO  360  DAYS 5/21/20   Berny Arriaza NP   predniSONE (STERAPRED DS) 10 mg dose pack See administration instruction per 10mg dose pack - 6 days 12/4/19   Berny Arriaza NP   diclofenac EC (VOLTAREN) 75 mg EC tablet Take 1 Tab by mouth two (2) times a day. For pain 12/4/19   Berny Arriaza NP   ondansetron (ZOFRAN ODT) 4 mg disintegrating tablet DISSOLVE ONE TABLET ON TONGUE  EVERY 8 HOURS AS NEEDED 12/4/19   Berny Arriaza NP   LORazepam (ATIVAN) 1 mg tablet Take 1 Tab by mouth two (2) times daily as needed for Anxiety (or sleep). Max Daily Amount: 2 mg.  Must last 30 days 12/4/19 5/27/20  Nanette Ocasio Lorrie SANCHEZ, SAMANTHA   DULoxetine (CYMBALTA) 60 mg capsule TAKE 1 CAPSULE BY MOUTH TWICE A DAY 7/30/19 5/27/20  Abad Mejia NP   cyclobenzaprine (FLEXERIL) 10 mg tablet Take 1 Tab by mouth nightly. 5/7/19   Abad Mejia NP   ibuprofen (MOTRIN) 800 mg tablet Take 1 Tab by mouth every eight (8) hours as needed for Pain. 5/7/19   Abad Mejia NP   losartan-hydroCHLOROthiazide (HYZAAR) 100-25 mg per tablet TAKE ONE TABLET BY MOUTH ONCE DAILY 5/7/19   Abad Mejia NP     No Known Allergies    Patient Active Problem List    Diagnosis Date Noted    Obesity, morbid ([facility] [address] 04/25/2018    Chronic pain     Obesity 08/18/2010    Fibromyalgia 08/18/2010    HTN (hypertension) 08/18/2010       ROS  A comprehensive review of system was obtained and negative except findings in the HPI    Objective:   Vital Signs: (As obtained by patient/caregiver at home)  There were no vitals taken for this visit.      [INSTRUCTIONS:  \"[x]\" Indicates a positive item  \"[]\" Indicates a negative item  -- DELETE ALL ITEMS NOT EXAMINED]    Constitutional: [x] Appears well-developed and well-nourished [x] No apparent distress      [] Abnormal -     Mental status: [x] Alert and awake  [x] Oriented to person/place/time [x] Able to follow commands    [] Abnormal -     Eyes:   EOM    [x]  Normal    [] Abnormal -   Sclera  [x]  Normal    [] Abnormal -          Discharge [x]  None visible   [] Abnormal -     HENT: [x] Normocephalic, atraumatic  [] Abnormal -   [x] Mouth/Throat: Mucous membranes are moist    External Ears [x] Normal  [] Abnormal -    Neck: [x] No visualized mass [] Abnormal -     Pulmonary/Chest: [x] Respiratory effort normal   [x] No visualized signs of difficulty breathing or respiratory distress        [] Abnormal -      Musculoskeletal:   [x] Normal gait with no signs of ataxia         [x] Normal range of motion of neck        [] Abnormal -     Neurological:        [x] No Facial Asymmetry (Cranial nerve 7 motor function) (limited exam due to video visit)          [x] No gaze palsy        [] Abnormal -          Skin:        [x] No significant exanthematous lesions or discoloration noted on facial skin         [] Abnormal -            Psychiatric:       [x] Normal Affect [] Abnormal -        [x] No Hallucinations    Other pertinent observable physical exam findings:- none    Diagnoses and all orders for this visit:    1. Fibromyalgia  -     DULoxetine (CYMBALTA) 60 mg capsule; TAKE 1 CAPSULE BY MOUTH TWICE A DAY    2. Insomnia, unspecified type  -     LORazepam (ATIVAN) 1 mg tablet; Take 1 Tab by mouth two (2) times daily as needed for Anxiety (or sleep). Max Daily Amount: 2 mg. Must last 30 days    3. Anxiety  -     LORazepam (ATIVAN) 1 mg tablet; Take 1 Tab by mouth two (2) times daily as needed for Anxiety (or sleep). Max Daily Amount: 2 mg. Must last 30 days      Refills updated today  Recheck 3 mo        We discussed the expected course, resolution and complications of the diagnosis(es) in detail. Medication risks, benefits, costs, interactions, and alternatives were discussed as indicated. I advised her to contact the office if her condition worsens, changes or fails to improve as anticipated. She expressed understanding with the diagnosis(es) and plan. Aureliano Brock is a 38 y.o. female who was evaluated by a video visit encounter for concerns as above. Patient identification was verified prior to start of the visit. A caregiver was present when appropriate. Due to this being a TeleHealth encounter (During KHLBU-91 public health emergency), evaluation of the following organ systems was limited: Vitals/Constitutional/EENT/Resp/CV/GI//MS/Neuro/Skin/Heme-Lymph-Imm.   Pursuant to the emergency declaration under the [facility] [address] waiver authority and the [employer] Act, this Virtual  Visit was conducted, with patient's (and/or legal guardian's) consent, to reduce the patient's risk of exposure to COVID-19 and provide necessary medical care. Services were provided through a video synchronous discussion virtually to substitute for in-person clinic visit. Patient and provider were located at their individual homes.       Jewels Chanel NP

## 2020-07-09 DIAGNOSIS — R11.0 NAUSEA: ICD-10-CM

## 2020-07-09 DIAGNOSIS — M79.7 FIBROMYALGIA: ICD-10-CM

## 2020-07-09 DIAGNOSIS — I10 ESSENTIAL HYPERTENSION: ICD-10-CM

## 2020-07-09 RX ORDER — LOSARTAN POTASSIUM AND HYDROCHLOROTHIAZIDE 25; 100 MG/1; MG/1
TABLET ORAL
Qty: 90 TAB | Refills: 3 | Status: SHIPPED | OUTPATIENT
Start: 2020-07-09 | End: 2021-01-15 | Stop reason: SDUPTHER

## 2020-07-09 RX ORDER — IBUPROFEN 800 MG/1
800 TABLET ORAL
Qty: 90 TAB | Refills: 1 | Status: SHIPPED | OUTPATIENT
Start: 2020-07-09 | End: 2020-09-21 | Stop reason: SDUPTHER

## 2020-07-09 RX ORDER — DICLOFENAC SODIUM 75 MG/1
75 TABLET, DELAYED RELEASE ORAL 2 TIMES DAILY
Qty: 60 TAB | Refills: 2 | Status: SHIPPED | OUTPATIENT
Start: 2020-07-09 | End: 2021-05-05 | Stop reason: SDUPTHER

## 2020-07-09 RX ORDER — ONDANSETRON 4 MG/1
TABLET, ORALLY DISINTEGRATING ORAL
Qty: 15 TAB | Refills: 2 | Status: SHIPPED | OUTPATIENT
Start: 2020-07-09 | End: 2020-10-20 | Stop reason: SDUPTHER

## 2020-07-09 RX ORDER — CYCLOBENZAPRINE HCL 10 MG
10 TABLET ORAL
Qty: 90 TAB | Refills: 1 | Status: SHIPPED | OUTPATIENT
Start: 2020-07-09 | End: 2021-05-05 | Stop reason: SDUPTHER

## 2020-07-09 RX ORDER — ALBUTEROL SULFATE 90 UG/1
AEROSOL, METERED RESPIRATORY (INHALATION)
Qty: 6 INHALER | Refills: 2 | Status: SHIPPED | OUTPATIENT
Start: 2020-07-09 | End: 2021-01-05 | Stop reason: SDUPTHER

## 2020-09-04 DIAGNOSIS — F41.9 ANXIETY: ICD-10-CM

## 2020-09-04 DIAGNOSIS — G47.00 INSOMNIA, UNSPECIFIED TYPE: ICD-10-CM

## 2020-09-07 RX ORDER — LORAZEPAM 1 MG/1
TABLET ORAL
Qty: 45 TAB | Refills: 0 | Status: SHIPPED | OUTPATIENT
Start: 2020-09-07 | End: 2020-10-14 | Stop reason: SDUPTHER

## 2020-09-21 DIAGNOSIS — M79.7 FIBROMYALGIA: ICD-10-CM

## 2020-09-21 RX ORDER — IBUPROFEN 800 MG/1
800 TABLET ORAL
Qty: 90 TAB | Refills: 1 | Status: SHIPPED | OUTPATIENT
Start: 2020-09-21 | End: 2020-12-03 | Stop reason: SDUPTHER

## 2020-10-01 ENCOUNTER — VIRTUAL VISIT (OUTPATIENT)
Dept: FAMILY MEDICINE CLINIC | Age: 45
End: 2020-10-01

## 2020-10-14 ENCOUNTER — VIRTUAL VISIT (OUTPATIENT)
Dept: FAMILY MEDICINE CLINIC | Age: 45
End: 2020-10-14
Payer: MEDICAID

## 2020-10-14 DIAGNOSIS — G47.00 INSOMNIA, UNSPECIFIED TYPE: ICD-10-CM

## 2020-10-14 DIAGNOSIS — I10 ESSENTIAL HYPERTENSION: ICD-10-CM

## 2020-10-14 DIAGNOSIS — F41.9 ANXIETY: Primary | ICD-10-CM

## 2020-10-14 PROCEDURE — 99213 OFFICE O/P EST LOW 20 MIN: CPT | Performed by: NURSE PRACTITIONER

## 2020-10-14 RX ORDER — BUPROPION HYDROCHLORIDE 150 MG/1
150 TABLET ORAL
Qty: 30 TAB | Refills: 5 | Status: SHIPPED | OUTPATIENT
Start: 2020-10-14 | End: 2021-05-05 | Stop reason: ALTCHOICE

## 2020-10-14 RX ORDER — AMLODIPINE BESYLATE 5 MG/1
5 TABLET ORAL DAILY
Qty: 30 TAB | Refills: 5 | Status: SHIPPED | OUTPATIENT
Start: 2020-10-14 | End: 2021-05-05 | Stop reason: SDUPTHER

## 2020-10-14 RX ORDER — LORAZEPAM 1 MG/1
TABLET ORAL
Qty: 45 TAB | Refills: 0 | Status: SHIPPED | OUTPATIENT
Start: 2020-10-14 | End: 2020-12-03 | Stop reason: SDUPTHER

## 2020-10-14 NOTE — PROGRESS NOTES
Michel Dillon is a 38 y.o. female who was seen by synchronous (real-time) audio-video technology on 10/14/2020 for No chief complaint on file. I spent at least 15 minutes on this visit with this established patient. 712  Subjective:   Having increase in stress and home life  bp has been high  Anxiety has been severe, on cymbalta and valium but not helping  bp ranges has been 150-160/100    Prior to Admission medications    Medication Sig Start Date End Date Taking? Authorizing Provider   buPROPion XL (WELLBUTRIN XL) 150 mg tablet Take 1 Tab by mouth every morning. 10/14/20  Yes Lorrie Monroy NP   LORazepam (ATIVAN) 1 mg tablet TAKE 1 TABLET BY MOUTH TWICE DAILY AS NEEDED FOR ANXIETY AND FOR SLEEP MAX  DAILY  AMOUNT  2MG. MUST  LAST  30  DAYS 10/14/20  Yes Adalberto Dust, NP   amLODIPine (NORVASC) 5 mg tablet Take 1 Tab by mouth daily. 10/14/20  Yes Adalberto Dust, NP   DULoxetine (CYMBALTA) 60 mg capsule TAKE 1 CAPSULE BY MOUTH TWICE A DAY 5/27/20  Yes Adalberto Dust, NP   ibuprofen (MOTRIN) 800 mg tablet Take 1 Tab by mouth every eight (8) hours as needed for Pain. 9/21/20   Adalberto Dust, NP   LORazepam (ATIVAN) 1 mg tablet TAKE 1 TABLET BY MOUTH TWICE DAILY AS NEEDED FOR ANXIETY AND FOR SLEEP MAX  DAILY  AMOUNT  2MG. MUST  LAST  30  DAYS 9/7/20 10/14/20  Adalberto Dust, NP   cyclobenzaprine (Flexeril) 10 mg tablet Take 1 Tab by mouth nightly. 7/9/20   Adalberto Dust, NP   losartan-hydroCHLOROthiazide (HYZAAR) 100-25 mg per tablet TAKE ONE TABLET BY MOUTH ONCE DAILY 7/9/20   Adalberto Dust, NP   diclofenac EC (VOLTAREN) 75 mg EC tablet Take 1 Tab by mouth two (2) times a day.  For pain 7/9/20   Adalberto Dust, NP   ondansetron (ZOFRAN ODT) 4 mg disintegrating tablet DISSOLVE ONE TABLET ON TONGUE  EVERY 8 HOURS AS NEEDED 7/9/20   Adalberto Dust, NP   albuterol (Proventil HFA) 90 mcg/actuation inhaler INHALE TWO PUFFS BY MOUTH EVERY 4 HOURS AS NEEDED FOR WHEEZING FOR  UP  TO  360  DAYS 7/9/20   Pepe Duffy NP   predniSONE [place] DS) 10 mg dose pack See administration instruction per 10mg dose pack - 6 days 12/4/19   Pepe Duffy NP     Patient Active Problem List    Diagnosis Date Noted    Obesity, morbid ([facility] [address] 04/25/2018    Chronic pain     Obesity 08/18/2010    Fibromyalgia 08/18/2010    HTN (hypertension) 08/18/2010       ROS    Objective:   No flowsheet data found. General: alert, cooperative, no distress   Mental  status: normal mood, behavior, speech, dress, motor activity, and thought processes, able to follow commands   HENT: NCAT   Neck: no visualized mass   Resp: no respiratory distress   Neuro: no gross deficits   Skin: no discoloration or lesions of concern on visible areas   Psychiatric: normal affect, consistent with stated mood, no evidence of hallucinations     Additional exam findings: none    Diagnoses and all orders for this visit:    1. Anxiety  -     LORazepam (ATIVAN) 1 mg tablet; TAKE 1 TABLET BY MOUTH TWICE DAILY AS NEEDED FOR ANXIETY AND FOR SLEEP MAX  DAILY  AMOUNT  2MG. MUST  LAST  30  DAYS    2. Essential hypertension    3. Insomnia, unspecified type  -     LORazepam (ATIVAN) 1 mg tablet; TAKE 1 TABLET BY MOUTH TWICE DAILY AS NEEDED FOR ANXIETY AND FOR SLEEP MAX  DAILY  AMOUNT  2MG. MUST  LAST  30  DAYS    Other orders  -     buPROPion XL (WELLBUTRIN XL) 150 mg tablet; Take 1 Tab by mouth every morning.  -     amLODIPine (NORVASC) 5 mg tablet; Take 1 Tab by mouth daily. Add wellbutrin xl 150mg a day for anxiety and depression to the cymbalta  Add Norvasc 5mg a day for bp  Reviewed adr/se of both and what to expect  Recheck 2 weeks    I have discussed the diagnosis with the patient and the intended plan as seen in the above orders. The patient has received an after-visit summary and questions were answered concerning future plans. Patient conveyed understanding of the plan at the time of the visit.     Lorrie FAITH Fabricio Henao, MSN, ANP  10/14/2020          We discussed the expected course, resolution and complications of the diagnosis(es) in detail. Medication risks, benefits, costs, interactions, and alternatives were discussed as indicated. I advised her to contact the office if her condition worsens, changes or fails to improve as anticipated. She expressed understanding with the diagnosis(es) and plan. Raquel Frank, who was evaluated through a patient-initiated, synchronous (real-time) audio-video encounter, and/or her healthcare decision maker, is aware that it is a billable service, with coverage as determined by her insurance carrier. She provided verbal consent to proceed: Yes, and patient identification was verified. It was conducted pursuant to the emergency declaration under the [facility] [address] authority and the [address] and [employer] Act. A caregiver was present when appropriate. Ability to conduct physical exam was limited. I was at home. The patient was at home.       Rayna Hickman NP

## 2020-10-20 DIAGNOSIS — R11.0 NAUSEA: ICD-10-CM

## 2020-10-20 RX ORDER — ONDANSETRON 4 MG/1
TABLET, ORALLY DISINTEGRATING ORAL
Qty: 15 TAB | Refills: 2 | Status: SHIPPED | OUTPATIENT
Start: 2020-10-20 | End: 2021-05-05 | Stop reason: SDUPTHER

## 2020-11-11 ENCOUNTER — TELEPHONE (OUTPATIENT)
Dept: FAMILY MEDICINE CLINIC | Age: 45
End: 2020-11-11

## 2020-12-08 DIAGNOSIS — M79.7 FIBROMYALGIA: ICD-10-CM

## 2020-12-08 RX ORDER — DULOXETIN HYDROCHLORIDE 60 MG/1
CAPSULE, DELAYED RELEASE ORAL
Qty: 60 CAP | Refills: 5 | Status: SHIPPED | OUTPATIENT
Start: 2020-12-08 | End: 2021-05-05 | Stop reason: SDUPTHER

## 2021-01-05 DIAGNOSIS — G47.00 INSOMNIA, UNSPECIFIED TYPE: ICD-10-CM

## 2021-01-05 DIAGNOSIS — F41.9 ANXIETY: ICD-10-CM

## 2021-01-05 RX ORDER — ALBUTEROL SULFATE 90 UG/1
AEROSOL, METERED RESPIRATORY (INHALATION)
Qty: 6 INHALER | Refills: 2 | Status: SHIPPED | OUTPATIENT
Start: 2021-01-05 | End: 2021-05-05 | Stop reason: SDUPTHER

## 2021-01-05 RX ORDER — LORAZEPAM 1 MG/1
TABLET ORAL
Qty: 45 TAB | Refills: 0 | Status: SHIPPED | OUTPATIENT
Start: 2021-01-05 | End: 2021-02-09 | Stop reason: SDUPTHER

## 2021-01-15 DIAGNOSIS — I10 ESSENTIAL HYPERTENSION: ICD-10-CM

## 2021-01-17 RX ORDER — LOSARTAN POTASSIUM AND HYDROCHLOROTHIAZIDE 25; 100 MG/1; MG/1
TABLET ORAL
Qty: 90 TAB | Refills: 3 | Status: SHIPPED | OUTPATIENT
Start: 2021-01-17 | End: 2022-01-04 | Stop reason: SDUPTHER

## 2021-03-17 ENCOUNTER — HOSPITAL ENCOUNTER (EMERGENCY)
Age: 46
Discharge: HOME | End: 2021-03-17
Attending: EMERGENCY MEDICINE
Payer: COMMERCIAL

## 2021-03-17 VITALS
DIASTOLIC BLOOD PRESSURE: 97 MMHG | OXYGEN SATURATION: 97 % | RESPIRATION RATE: 24 BRPM | HEART RATE: 109 BPM | SYSTOLIC BLOOD PRESSURE: 136 MMHG

## 2021-03-17 DIAGNOSIS — R40.20 LOSS OF CONSCIOUSNESS (HCC): Primary | ICD-10-CM

## 2021-03-17 DIAGNOSIS — M54.50 ACUTE RIGHT-SIDED LOW BACK PAIN WITHOUT SCIATICA: ICD-10-CM

## 2021-03-17 LAB
AMPHET UR QL SCN: NEGATIVE
ANION GAP SERPL CALC-SCNC: 8 MMOL/L (ref 5–15)
APAP SERPL-MCNC: <10 UG/ML (ref 10–30)
APPEARANCE UR: ABNORMAL
BACTERIA URNS QL MICRO: ABNORMAL /HPF
BARBITURATES UR QL SCN: NEGATIVE
BENZODIAZ UR QL: NEGATIVE
BILIRUB UR QL: NEGATIVE
BUN SERPL-MCNC: 16 MG/DL (ref 6–20)
BUN/CREAT SERPL: 11 (ref 12–20)
CA-I BLD-MCNC: 8.7 MG/DL (ref 8.5–10.1)
CANNABINOIDS UR QL SCN: NEGATIVE
CHLORIDE SERPL-SCNC: 102 MMOL/L (ref 97–108)
CO2 SERPL-SCNC: 33 MMOL/L (ref 21–32)
COCAINE UR QL SCN: NEGATIVE
COLOR UR: ABNORMAL
CREAT SERPL-MCNC: 1.48 MG/DL (ref 0.55–1.02)
DATE LAST DOSE: ABNORMAL
DATE LAST DOSE: ABNORMAL
DRUG SCRN COMMENT,DRGCM: NORMAL
ECSTASY, ECST: NEGATIVE
ETHANOL SERPL-MCNC: <4 MG/DL
GLUCOSE BLD STRIP.AUTO-MCNC: 324 MG/DL (ref 65–100)
GLUCOSE SERPL-MCNC: 217 MG/DL (ref 65–100)
GLUCOSE UR STRIP.AUTO-MCNC: 100 MG/DL
HGB UR QL STRIP: ABNORMAL
KETONES SERPL QL: NEGATIVE
KETONES UR QL STRIP.AUTO: NEGATIVE MG/DL
LEUKOCYTE ESTERASE UR QL STRIP.AUTO: NEGATIVE
METHADONE UR QL: NEGATIVE
NITRITE UR QL STRIP.AUTO: NEGATIVE
OPIATES UR QL: NEGATIVE
PCP UR QL: NEGATIVE
PERFORMED BY, TECHID: ABNORMAL
PH UR STRIP: 6 [PH] (ref 5–8)
POTASSIUM SERPL-SCNC: 3.8 MMOL/L (ref 3.5–5.1)
PROT UR STRIP-MCNC: 100 MG/DL
RBC #/AREA URNS HPF: >100 /HPF (ref 0–3)
REPORTED DOSE,DOSE: ABNORMAL UNITS
REPORTED DOSE,DOSE: ABNORMAL UNITS
SALICYLATES SERPL-MCNC: <1.7 MG/DL (ref 2.8–20)
SODIUM SERPL-SCNC: 143 MMOL/L (ref 136–145)
SP GR UR REFRACTOMETRY: 1.03
SP GR UR REFRACTOMETRY: >1.03 (ref 1–1.03)
UROBILINOGEN UR QL STRIP.AUTO: 1 EU/DL (ref 0.2–1)
WBC URNS QL MICRO: ABNORMAL /HPF (ref 0–5)

## 2021-03-17 PROCEDURE — 81001 URINALYSIS AUTO W/SCOPE: CPT

## 2021-03-17 PROCEDURE — 80048 BASIC METABOLIC PNL TOTAL CA: CPT

## 2021-03-17 PROCEDURE — 82962 GLUCOSE BLOOD TEST: CPT

## 2021-03-17 PROCEDURE — 80307 DRUG TEST PRSMV CHEM ANLYZR: CPT

## 2021-03-17 PROCEDURE — 80143 DRUG ASSAY ACETAMINOPHEN: CPT

## 2021-03-17 PROCEDURE — 82077 ASSAY SPEC XCP UR&BREATH IA: CPT

## 2021-03-17 PROCEDURE — 99285 EMERGENCY DEPT VISIT HI MDM: CPT

## 2021-03-17 PROCEDURE — 82009 KETONE BODYS QUAL: CPT

## 2021-03-17 PROCEDURE — 80179 DRUG ASSAY SALICYLATE: CPT

## 2021-03-17 NOTE — ED NOTES
Patient s/o Elena Winn is at bedside. He facilitates the patient cooperation with labs draw. Tele applied and promptly removed by patient; we are unable to gain cooperation. Patient is AAO vitals stable.

## 2021-03-17 NOTE — ED PROVIDER NOTES
EMERGENCY DEPARTMENT HISTORY AND PHYSICAL EXAM      Date: 3/17/2021  Patient Name: Fran Godoy    History of Presenting Illness     Chief Complaint   Patient presents with    Drug Overdose       History Provided By: Patient and EMS    HPI: Fran Godoy, 54 y.o. female with a past medical history significant obesity and Back pain presents to the ED with cc of being found unconscious with response to combination of ammonia, naloxone and sternal rub; patient being uncooperative with answering questions and giving history but complaining of back pain    There are no other complaints, changes, or physical findings at this time. PCP: None    No current facility-administered medications on file prior to encounter. Current Outpatient Medications on File Prior to Encounter   Medication Sig Dispense Refill    ibuprofen (MOTRIN) 800 mg tablet Take 1 Tab by mouth every eight (8) hours as needed for Pain. 90 Tab 1    LORazepam (ATIVAN) 1 mg tablet TAKE 1 TABLET BY MOUTH TWICE DAILY AS NEEDED FOR ANXIETY AND FOR SLEEP MAX  DAILY  AMOUNT  2MG. MUST  LAST  30  DAYS 45 Tab 0    losartan-hydroCHLOROthiazide (HYZAAR) 100-25 mg per tablet TAKE ONE TABLET BY MOUTH ONCE DAILY 90 Tab 3    albuterol (Proventil HFA) 90 mcg/actuation inhaler INHALE TWO PUFFS BY MOUTH EVERY 4 HOURS AS NEEDED FOR WHEEZING FOR  UP  TO  360  DAYS 6 Inhaler 2    DULoxetine (CYMBALTA) 60 mg capsule TAKE 1 CAPSULE BY MOUTH TWICE A DAY 60 Cap 5    ondansetron (ZOFRAN ODT) 4 mg disintegrating tablet DISSOLVE ONE TABLET ON TONGUE  EVERY 8 HOURS AS NEEDED 15 Tab 2    buPROPion XL (WELLBUTRIN XL) 150 mg tablet Take 1 Tab by mouth every morning. 30 Tab 5    amLODIPine (NORVASC) 5 mg tablet Take 1 Tab by mouth daily. 30 Tab 5    cyclobenzaprine (Flexeril) 10 mg tablet Take 1 Tab by mouth nightly. 90 Tab 1    diclofenac EC (VOLTAREN) 75 mg EC tablet Take 1 Tab by mouth two (2) times a day.  For pain 60 Tab 2    predniSONE (STERAPRED DS) 10 mg dose pack See administration instruction per 10mg dose pack - 6 days 21 Tab 0       Past History     Past Medical History:  Past Medical History:   Diagnosis Date    Asthma     Chronic pain     Depression     Fibromyalgia     Hypertension        Past Surgical History:  Past Surgical History:   Procedure Laterality Date    HX CHOLECYSTECTOMY      HX GYN         Family History:  Family History   Problem Relation Age of Onset    Elevated Lipids Mother     Arthritis-rheumatoid Mother        Social History:  Social History     Tobacco Use    Smoking status: Never Smoker    Smokeless tobacco: Never Used   Substance Use Topics    Alcohol use: No    Drug use: No       Allergies:  No Known Allergies      Review of Systems     Review of Systems   Constitutional: Negative for chills and fever. HENT: Negative for rhinorrhea and sore throat. Eyes: Negative for pain and visual disturbance. Respiratory: Negative for cough and shortness of breath. Cardiovascular: Negative for chest pain and leg swelling. Gastrointestinal: Negative for nausea. Endocrine: Negative for polydipsia and polyuria. Genitourinary: Negative for dysuria and urgency. Musculoskeletal: Negative for back pain and neck pain. Skin: Negative for color change and pallor. Neurological: Negative for weakness and numbness. Psychiatric/Behavioral: Negative. Physical Exam     Physical Exam  Vitals signs and nursing note reviewed. Constitutional:       Appearance: She is morbidly obese. HENT:      Head: Normocephalic and atraumatic. Mouth/Throat:      Mouth: Mucous membranes are moist.      Pharynx: Oropharynx is clear. Eyes:      Extraocular Movements: Extraocular movements intact. Conjunctiva/sclera: Conjunctivae normal.      Pupils: Pupils are equal, round, and reactive to light. Neck:      Musculoskeletal: Normal range of motion and neck supple.    Cardiovascular:      Rate and Rhythm: Normal rate and regular rhythm. Pulses: Normal pulses. Pulmonary:      Effort: Pulmonary effort is normal.      Breath sounds: Normal breath sounds. Abdominal:      General: Bowel sounds are normal.   Musculoskeletal:         General: Tenderness present. No swelling, deformity or signs of injury. Lumbar back: She exhibits tenderness and pain. Skin:     General: Skin is warm and dry. Capillary Refill: Capillary refill takes less than 2 seconds. Neurological:      General: No focal deficit present. Mental Status: She is alert. Cranial Nerves: No cranial nerve deficit. Sensory: No sensory deficit. Motor: No weakness.    Psychiatric:         Mood and Affect: Mood normal.         Behavior: Behavior normal.         Lab and Diagnostic Study Results     Labs -     Recent Results (from the past 12 hour(s))   GLUCOSE, POC    Collection Time: 03/17/21  2:57 PM   Result Value Ref Range    Glucose (POC) 324 (H) 65 - 100 mg/dL    Performed by [facility]    METABOLIC PANEL, BASIC    Collection Time: 03/17/21  3:51 PM   Result Value Ref Range    Sodium 143 136 - 145 mmol/L    Potassium 3.8 3.5 - 5.1 mmol/L    Chloride 102 97 - 108 mmol/L    CO2 33 (H) 21 - 32 mmol/L    Anion gap 8 5 - 15 mmol/L    Glucose 217 (H) 65 - 100 mg/dL    BUN 16 6 - 20 mg/dL    Creatinine 1.48 (H) 0.55 - 1.02 mg/dL    BUN/Creatinine ratio 11 (L) 12 - 20      GFR est AA 46 (L) >60 ml/min/1.73m2    GFR est non-AA 38 (L) >60 ml/min/1.73m2    Calcium 8.7 8.5 - 10.1 mg/dL   ETHYL ALCOHOL    Collection Time: 03/17/21  3:51 PM   Result Value Ref Range    ALCOHOL(ETHYL),SERUM <4 <07 mg/dL   SALICYLATE    Collection Time: 03/17/21  3:51 PM   Result Value Ref Range    Salicylate level <1.4 (L) 2.8 - 20.0 mg/dL    Reported dose date No Special Requests      Reported dose: No Special Requests Units   ACETAMINOPHEN    Collection Time: 03/17/21  3:51 PM   Result Value Ref Range    Acetaminophen level <10 (L) 10 - 30 ug/mL    Reported dose date No Special Requests      Reported dose: No Special Requests Units   ACETONE/KETONE, QL    Collection Time: 03/17/21  3:51 PM   Result Value Ref Range    Acetone/Ketone serum, QL. Negative         Radiologic Studies -   @lastxrresult@  CT Results  (Last 48 hours)    None        CXR Results  (Last 48 hours)    None            Medical Decision Making   - I am the first provider for this patient. - I reviewed the vital signs, available nursing notes, past medical history, past surgical history, family history and social history. - Initial assessment performed. The patients presenting problems have been discussed, and they are in agreement with the care plan formulated and outlined with them. I have encouraged them to ask questions as they arise throughout their visit. Vital Signs-Reviewed the patient's vital signs. Patient Vitals for the past 12 hrs:   Pulse Resp BP SpO2   03/17/21 1548 (!) 109 24 (!) 136/97 97 %   03/17/21 1451  18 103/60        Records Reviewed: Nursing Notes    The patient presents with back pain with a differential diagnosis of  kidney stone, lumbar strain and traumatic injury      ED Course:          Provider Notes (Medical Decision Making): MDM       Procedures   Medical Decision Makingedical Decision Making  Performed by: Amy Pringle MD  PROCEDURES:  Procedures       Disposition   Disposition: Condition stable and improved  DC- Adult Discharges: All of the diagnostic tests were reviewed and questions answered. Diagnosis, care plan and treatment options were discussed. The patient understands the instructions and will follow up as directed. The patients results have been reviewed with them. They have been counseled regarding their diagnosis. The patient verbally convey understanding and agreement of the signs, symptoms, diagnosis, treatment and prognosis and additionally agrees to follow up as recommended with their PCP in 24 - 48 hours.   They also agree with the care-plan and convey that all of their questions have been answered. I have also put together some discharge instructions for them that include: 1) educational information regarding their diagnosis, 2) how to care for their diagnosis at home, as well a 3) list of reasons why they would want to return to the ED prior to their follow-up appointment, should their condition change. Discharged    DISCHARGE PLAN:  1. Current Discharge Medication List      CONTINUE these medications which have NOT CHANGED    Details   ibuprofen (MOTRIN) 800 mg tablet Take 1 Tab by mouth every eight (8) hours as needed for Pain. Qty: 90 Tab, Refills: 1    Associated Diagnoses: Fibromyalgia      LORazepam (ATIVAN) 1 mg tablet TAKE 1 TABLET BY MOUTH TWICE DAILY AS NEEDED FOR ANXIETY AND FOR SLEEP MAX  DAILY  AMOUNT  2MG. MUST  LAST  30  DAYS  Qty: 45 Tab, Refills: 0    Associated Diagnoses: Insomnia, unspecified type; Anxiety      losartan-hydroCHLOROthiazide (HYZAAR) 100-25 mg per tablet TAKE ONE TABLET BY MOUTH ONCE DAILY  Qty: 90 Tab, Refills: 3    Associated Diagnoses: Essential hypertension      albuterol (Proventil HFA) 90 mcg/actuation inhaler INHALE TWO PUFFS BY MOUTH EVERY 4 HOURS AS NEEDED FOR WHEEZING FOR  UP  TO  360  DAYS  Qty: 6 Inhaler, Refills: 2      DULoxetine (CYMBALTA) 60 mg capsule TAKE 1 CAPSULE BY MOUTH TWICE A DAY  Qty: 60 Cap, Refills: 5    Associated Diagnoses: Fibromyalgia      ondansetron (ZOFRAN ODT) 4 mg disintegrating tablet DISSOLVE ONE TABLET ON TONGUE  EVERY 8 HOURS AS NEEDED  Qty: 15 Tab, Refills: 2    Comments: Please consider 90 day supplies to promote better adherence  Associated Diagnoses: Nausea      buPROPion XL (WELLBUTRIN XL) 150 mg tablet Take 1 Tab by mouth every morning. Qty: 30 Tab, Refills: 5      amLODIPine (NORVASC) 5 mg tablet Take 1 Tab by mouth daily. Qty: 30 Tab, Refills: 5      cyclobenzaprine (Flexeril) 10 mg tablet Take 1 Tab by mouth nightly.   Qty: 90 Tab, Refills: 1 Associated Diagnoses: Fibromyalgia      diclofenac EC (VOLTAREN) 75 mg EC tablet Take 1 Tab by mouth two (2) times a day. For pain  Qty: 60 Tab, Refills: 2      predniSONE (STERAPRED DS) 10 mg dose pack See administration instruction per 10mg dose pack - 6 days  Qty: 21 Tab, Refills: 0           2. Follow-up Information     Follow up With Specialties Details Why Contact Info    Your primary doctor  Call  As needed         3. Return to ED if worse   4. Current Discharge Medication List            Diagnosis     Clinical Impression: No diagnosis found. Attestations:    Amy Pringle MD    Please note that this dictation was completed with [employer], the computer voice recognition software. Quite often unanticipated grammatical, syntax, homophones, and other interpretive errors are inadvertently transcribed by the computer software. Please disregard these errors. Please excuse any errors that have escaped final proofreading. Thank you.

## 2021-03-17 NOTE — ED NOTES
Patient presents to ED awake, uncooperative. Removed telemetry x 3, removed BP cuff, refuses to answer questions. Yelling at nurses trying to assess her. Complaining of back pain but will not permit any nursing care.

## 2021-03-17 NOTE — ED TRIAGE NOTES
According to EMS pt was unresponsive upon their arrival. 2mg Narcan was administered. Pt became responsive according to EMS after Narcan administration. Pt agitated and complaining of \"back pain. \"

## 2021-03-22 ENCOUNTER — HOSPITAL ENCOUNTER (EMERGENCY)
Age: 46
Discharge: HOME | End: 2021-03-22
Attending: EMERGENCY MEDICINE
Payer: MEDICAID

## 2021-03-22 VITALS
TEMPERATURE: 99.1 F | WEIGHT: 293 LBS | BODY MASS INDEX: 63.28 KG/M2 | OXYGEN SATURATION: 99 % | SYSTOLIC BLOOD PRESSURE: 175 MMHG | HEART RATE: 89 BPM | RESPIRATION RATE: 20 BRPM | DIASTOLIC BLOOD PRESSURE: 103 MMHG

## 2021-03-22 DIAGNOSIS — M79.7 FIBROMYALGIA: ICD-10-CM

## 2021-03-22 DIAGNOSIS — M54.6 ACUTE RIGHT-SIDED THORACIC BACK PAIN: Primary | ICD-10-CM

## 2021-03-22 LAB
AMORPH CRY URNS QL MICRO: ABNORMAL
ANION GAP SERPL CALC-SCNC: 5 MMOL/L (ref 5–15)
APPEARANCE UR: CLEAR
BACTERIA URNS QL MICRO: ABNORMAL /HPF
BASOPHILS # BLD: 0.2 K/UL (ref 0–0.2)
BASOPHILS NFR BLD: 2 % (ref 0–2.5)
BILIRUB UR QL: NEGATIVE
BUN SERPL-MCNC: 6 MG/DL (ref 6–20)
BUN/CREAT SERPL: 9 (ref 12–20)
CA-I BLD-MCNC: 8.5 MG/DL (ref 8.5–10.1)
CHLORIDE SERPL-SCNC: 98 MMOL/L (ref 97–108)
CO2 SERPL-SCNC: 35 MMOL/L (ref 21–32)
COLOR UR: ABNORMAL
CREAT SERPL-MCNC: 0.69 MG/DL (ref 0.55–1.02)
EOSINOPHIL # BLD: 0.3 K/UL (ref 0–0.7)
EOSINOPHIL NFR BLD: 2 % (ref 0.9–2.9)
ERYTHROCYTE [DISTWIDTH] IN BLOOD BY AUTOMATED COUNT: 16.7 % (ref 11.5–14.5)
GLUCOSE SERPL-MCNC: 107 MG/DL (ref 65–100)
GLUCOSE UR STRIP.AUTO-MCNC: NEGATIVE MG/DL
HCT VFR BLD AUTO: 35.7 % (ref 36–46)
HGB BLD-MCNC: 11.2 G/DL (ref 13.5–17.5)
HGB UR QL STRIP: ABNORMAL
KETONES UR QL STRIP.AUTO: NEGATIVE MG/DL
LEUKOCYTE ESTERASE UR QL STRIP.AUTO: NEGATIVE
LYMPHOCYTES # BLD: 4 K/UL (ref 1–4.8)
LYMPHOCYTES NFR BLD: 26 % (ref 20.5–51.1)
MCH RBC QN AUTO: 25.1 PG (ref 31–34)
MCHC RBC AUTO-ENTMCNC: 31.3 G/DL (ref 31–36)
MCV RBC AUTO: 80.1 FL (ref 80–100)
MONOCYTES # BLD: 1.5 K/UL (ref 0.2–2.4)
MONOCYTES NFR BLD: 10 % (ref 1.7–9.3)
NEUTS SEG # BLD: 9.4 K/UL (ref 1.8–7.7)
NEUTS SEG NFR BLD: 60 % (ref 42–75)
NITRITE UR QL STRIP.AUTO: NEGATIVE
NRBC # BLD: 0.03 K/UL
NRBC BLD-RTO: 0.2 PER 100 WBC
PH UR STRIP: 7.5 [PH] (ref 5–8)
PLATELET # BLD AUTO: 341 K/UL
PMV BLD AUTO: 8.8 FL (ref 6.5–11.5)
POTASSIUM SERPL-SCNC: 3.4 MMOL/L (ref 3.5–5.1)
PROT UR STRIP-MCNC: NEGATIVE MG/DL
RBC # BLD AUTO: 4.46 M/UL (ref 4.5–5.9)
RBC #/AREA URNS HPF: ABNORMAL /HPF (ref 0–3)
SODIUM SERPL-SCNC: 138 MMOL/L (ref 136–145)
SP GR UR REFRACTOMETRY: 1.01 (ref 1–1.03)
UROBILINOGEN UR QL STRIP.AUTO: 4 EU/DL (ref 0.2–1)
WBC # BLD AUTO: 15.4 K/UL (ref 4.4–11.3)
WBC URNS QL MICRO: ABNORMAL /HPF (ref 0–5)

## 2021-03-22 PROCEDURE — 80048 BASIC METABOLIC PNL TOTAL CA: CPT

## 2021-03-22 PROCEDURE — 99284 EMERGENCY DEPT VISIT MOD MDM: CPT

## 2021-03-22 PROCEDURE — 74011250637 HC RX REV CODE- 250/637: Performed by: EMERGENCY MEDICINE

## 2021-03-22 PROCEDURE — 81001 URINALYSIS AUTO W/SCOPE: CPT

## 2021-03-22 PROCEDURE — 96372 THER/PROPH/DIAG INJ SC/IM: CPT

## 2021-03-22 PROCEDURE — 85025 COMPLETE CBC W/AUTO DIFF WBC: CPT

## 2021-03-22 PROCEDURE — 74011250636 HC RX REV CODE- 250/636: Performed by: EMERGENCY MEDICINE

## 2021-03-22 RX ORDER — LORAZEPAM 0.5 MG/1
1 TABLET ORAL
Qty: 15 TAB | Refills: 0 | Status: SHIPPED | OUTPATIENT
Start: 2021-03-22 | End: 2021-05-06 | Stop reason: SDUPTHER

## 2021-03-22 RX ORDER — HYDROCHLOROTHIAZIDE 12.5 MG/1
12.5 CAPSULE ORAL DAILY
Status: DISCONTINUED | OUTPATIENT
Start: 2021-03-22 | End: 2021-03-22 | Stop reason: HOSPADM

## 2021-03-22 RX ORDER — LORAZEPAM 2 MG/ML
2 INJECTION INTRAMUSCULAR
Status: COMPLETED | OUTPATIENT
Start: 2021-03-22 | End: 2021-03-22

## 2021-03-22 RX ORDER — IBUPROFEN 800 MG/1
800 TABLET ORAL
Qty: 20 TAB | Refills: 0 | Status: SHIPPED | OUTPATIENT
Start: 2021-03-22 | End: 2021-03-29

## 2021-03-22 RX ORDER — KETOROLAC TROMETHAMINE 30 MG/ML
60 INJECTION, SOLUTION INTRAMUSCULAR; INTRAVENOUS
Status: COMPLETED | OUTPATIENT
Start: 2021-03-22 | End: 2021-03-22

## 2021-03-22 RX ORDER — LOSARTAN POTASSIUM 100 MG/1
100 TABLET ORAL DAILY
Status: DISCONTINUED | OUTPATIENT
Start: 2021-03-22 | End: 2021-03-22 | Stop reason: HOSPADM

## 2021-03-22 RX ADMIN — HYDROCHLOROTHIAZIDE 12.5 MG: 12.5 CAPSULE ORAL at 11:41

## 2021-03-22 RX ADMIN — LOSARTAN POTASSIUM 100 MG: 100 TABLET, FILM COATED ORAL at 11:41

## 2021-03-22 RX ADMIN — LORAZEPAM 2 MG: 2 INJECTION INTRAMUSCULAR; INTRAVENOUS at 11:45

## 2021-03-22 RX ADMIN — KETOROLAC TROMETHAMINE 60 MG: 30 INJECTION, SOLUTION INTRAMUSCULAR at 11:43

## 2021-03-22 NOTE — ED PROVIDER NOTES
EMERGENCY DEPARTMENT HISTORY AND PHYSICAL EXAM      Date: 3/22/2021  Patient Name: Greer Turner    History of Presenting Illness     Chief Complaint   Patient presents with    Flank Pain    Back Pain       History Provided By: Patient    HPI: Greer Turner, 54 y.o. female with a past medical history significant Fibromyalgia morbid obesity and chronic back pain presents to the ED with cc of right thoracic back pain ongoing now for 1 week. Patient was seen in the ED for similar complaints 1 week ago pain is waxing and waning worse this morning. Denies urinary symptoms. There are no other complaints, changes, or physical findings at this time. PCP: None    No current facility-administered medications on file prior to encounter. Current Outpatient Medications on File Prior to Encounter   Medication Sig Dispense Refill    ibuprofen (MOTRIN) 800 mg tablet Take 1 Tab by mouth every eight (8) hours as needed for Pain. 90 Tab 1    LORazepam (ATIVAN) 1 mg tablet TAKE 1 TABLET BY MOUTH TWICE DAILY AS NEEDED FOR ANXIETY AND FOR SLEEP MAX  DAILY  AMOUNT  2MG. MUST  LAST  30  DAYS 45 Tab 0    losartan-hydroCHLOROthiazide (HYZAAR) 100-25 mg per tablet TAKE ONE TABLET BY MOUTH ONCE DAILY 90 Tab 3    albuterol (Proventil HFA) 90 mcg/actuation inhaler INHALE TWO PUFFS BY MOUTH EVERY 4 HOURS AS NEEDED FOR WHEEZING FOR  UP  TO  360  DAYS 6 Inhaler 2    DULoxetine (CYMBALTA) 60 mg capsule TAKE 1 CAPSULE BY MOUTH TWICE A DAY 60 Cap 5    ondansetron (ZOFRAN ODT) 4 mg disintegrating tablet DISSOLVE ONE TABLET ON TONGUE  EVERY 8 HOURS AS NEEDED 15 Tab 2    buPROPion XL (WELLBUTRIN XL) 150 mg tablet Take 1 Tab by mouth every morning. 30 Tab 5    amLODIPine (NORVASC) 5 mg tablet Take 1 Tab by mouth daily. 30 Tab 5    cyclobenzaprine (Flexeril) 10 mg tablet Take 1 Tab by mouth nightly. 90 Tab 1    diclofenac EC (VOLTAREN) 75 mg EC tablet Take 1 Tab by mouth two (2) times a day.  For pain 60 Tab 2    predniSONE (STERAPRED DS) 10 mg dose pack See administration instruction per 10mg dose pack - 6 days 21 Tab 0       Past History     Past Medical History:  Past Medical History:   Diagnosis Date    Asthma     Chronic pain     Depression     Fibromyalgia     Hypertension        Past Surgical History:  Past Surgical History:   Procedure Laterality Date    HX CHOLECYSTECTOMY      HX GYN         Family History:  Family History   Problem Relation Age of Onset    Elevated Lipids Mother     Arthritis-rheumatoid Mother        Social History:  Social History     Tobacco Use    Smoking status: Never Smoker    Smokeless tobacco: Never Used   Substance Use Topics    Alcohol use: No    Drug use: No       Allergies:  No Known Allergies      Review of Systems     Review of Systems   Constitutional: Negative for appetite change, chills, fatigue and fever. HENT: Negative. Eyes: Negative. Respiratory: Negative. Cardiovascular: Negative. Gastrointestinal: Negative. Genitourinary: Positive for flank pain. Negative for decreased urine volume, dysuria, frequency, hematuria, menstrual problem, pelvic pain and urgency. Neurological: Negative. Hematological: Negative. Psychiatric/Behavioral: Negative. Physical Exam   Obese white female in moderate pain tearful  Physical Exam  Vitals signs and nursing note reviewed. Constitutional:       General: She is not in acute distress. Appearance: She is obese. She is not ill-appearing, toxic-appearing or diaphoretic. HENT:      Head: Normocephalic and atraumatic. Nose: Nose normal.      Mouth/Throat:      Mouth: Mucous membranes are moist.   Eyes:      Extraocular Movements: Extraocular movements intact. Conjunctiva/sclera: Conjunctivae normal.      Pupils: Pupils are equal, round, and reactive to light. Neck:      Musculoskeletal: Normal range of motion and neck supple. No neck rigidity or muscular tenderness.    Cardiovascular:      Rate and Rhythm: Normal rate and regular rhythm. Pulses: Normal pulses. Heart sounds: Normal heart sounds. No murmur. Comments: Heart rate approximately 100  Pulmonary:      Effort: Pulmonary effort is normal. No respiratory distress. Breath sounds: Normal breath sounds. No wheezing, rhonchi or rales. Chest:      Chest wall: No tenderness. Abdominal:      General: Abdomen is flat. Palpations: There is no mass. Tenderness: There is no abdominal tenderness. There is right CVA tenderness. There is no left CVA tenderness, guarding or rebound. Hernia: No hernia is present. Comments: Tender in the right thoracic back this seems to be in the paraspinal muscle area or possibly CVA tenderness   Musculoskeletal: Normal range of motion. General: No tenderness, deformity or signs of injury. Right lower leg: No edema. Left lower leg: No edema. Skin:     General: Skin is warm. Findings: No erythema or rash. Neurological:      General: No focal deficit present. Mental Status: She is alert and oriented to person, place, and time. Cranial Nerves: No cranial nerve deficit. Sensory: No sensory deficit. Motor: No weakness. Psychiatric:         Mood and Affect: Mood normal.         Behavior: Behavior normal.         Thought Content:  Thought content normal.         Lab and Diagnostic Study Results     Labs -     Recent Results (from the past 12 hour(s))   CBC WITH AUTOMATED DIFF    Collection Time: 03/22/21 11:53 AM   Result Value Ref Range    WBC 15.4 (H) 4.4 - 11.3 K/uL    RBC 4.46 (L) 4.50 - 5.90 M/uL    HGB 11.2 (L) 13.5 - 17.5 g/dL    HCT 35.7 (L) 36 - 46 %    MCV 80.1 80 - 100 FL    MCH 25.1 (L) 31 - 34 PG    MCHC 31.3 31.0 - 36.0 g/dL    RDW 16.7 (H) 11.5 - 14.5 %    PLATELET 864 K/uL    MPV 8.8 6.5 - 11.5 FL    NRBC 0.2  WBC    ABSOLUTE NRBC 0.03 K/uL    NEUTROPHILS 60 42 - 75 %    LYMPHOCYTES 26 20.5 - 51.1 %    MONOCYTES 10 (H) 1.7 - 9.3 %    EOSINOPHILS 2 0.9 - 2.9 %    BASOPHILS 2 0.0 - 2.5 %    ABS. NEUTROPHILS 9.4 (H) 1.8 - 7.7 K/UL    ABS. LYMPHOCYTES 4.0 1.0 - 4.8 K/UL    ABS. MONOCYTES 1.5 0.2 - 2.4 K/UL    ABS. EOSINOPHILS 0.3 0.0 - 0.7 K/UL    ABS. BASOPHILS 0.2 0.0 - 0.2 K/UL   METABOLIC PANEL, BASIC    Collection Time: 03/22/21 11:53 AM   Result Value Ref Range    Sodium 138 136 - 145 mmol/L    Potassium 3.4 (L) 3.5 - 5.1 mmol/L    Chloride 98 97 - 108 mmol/L    CO2 35 (H) 21 - 32 mmol/L    Anion gap 5 5 - 15 mmol/L    Glucose 107 (H) 65 - 100 mg/dL    BUN 6 6 - 20 mg/dL    Creatinine 0.69 0.55 - 1.02 mg/dL    BUN/Creatinine ratio 9 (L) 12 - 20      GFR est AA >60 >60 ml/min/1.73m2    GFR est non-AA >60 >60 ml/min/1.73m2    Calcium 8.5 8.5 - 10.1 mg/dL       Radiologic Studies -   @lastxrresult@  CT Results  (Last 48 hours)    None        CXR Results  (Last 48 hours)    None            Medical Decision Making   - I am the first provider for this patient. - I reviewed the vital signs, available nursing notes, past medical history, past surgical history, family history and social history. - Initial assessment performed. The patients presenting problems have been discussed, and they are in agreement with the care plan formulated and outlined with them. I have encouraged them to ask questions as they arise throughout their visit. Vital Signs-Reviewed the patient's vital signs. Patient Vitals for the past 12 hrs:   Temp Pulse Resp BP SpO2   03/22/21 1040 97.9 °F (36.6 °C) (!) 110 26 (!) 217/172 98 %       Records Reviewed: Nursing Notes and Old Medical Records old records from office visits note multiple visits for similar back pain complaints and fibromyalgia    The patient presents with back pain flank pain with a differential diagnosis of myofascial pain; kidney stone; pyelonephritis; herpes zoster; radiculopathy      ED Course:          Provider Notes (Medical Decision Making):   9431:  More comfortable after medications labs show mild leukocytosis most likely stress related awaiting UA    UA clear  MDM       Procedures   Medical Decision Makingedical Decision Making  Performed by: Vandana Kearns MD  PROCEDURES:  Procedures       Disposition   Disposition: Condition stable and improved  DC- Adult Discharges: All of the diagnostic tests were reviewed and questions answered. Diagnosis, care plan and treatment options were discussed. The patient understands the instructions and will follow up as directed. The patients results have been reviewed with them. They have been counseled regarding their diagnosis. The patient verbally convey understanding and agreement of the signs, symptoms, diagnosis, treatment and prognosis and additionally agrees to follow up as recommended with their PCP in 24 - 48 hours. They also agree with the care-plan and convey that all of their questions have been answered. I have also put together some discharge instructions for them that include: 1) educational information regarding their diagnosis, 2) how to care for their diagnosis at home, as well a 3) list of reasons why they would want to return to the ED prior to their follow-up appointment, should their condition change. DISCHARGE PLAN:  1. Current Discharge Medication List      CONTINUE these medications which have NOT CHANGED    Details   ibuprofen (MOTRIN) 800 mg tablet Take 1 Tab by mouth every eight (8) hours as needed for Pain. Qty: 90 Tab, Refills: 1    Associated Diagnoses: Fibromyalgia      LORazepam (ATIVAN) 1 mg tablet TAKE 1 TABLET BY MOUTH TWICE DAILY AS NEEDED FOR ANXIETY AND FOR SLEEP MAX  DAILY  AMOUNT  2MG. MUST  LAST  30  DAYS  Qty: 45 Tab, Refills: 0    Associated Diagnoses: Insomnia, unspecified type;  Anxiety      losartan-hydroCHLOROthiazide (HYZAAR) 100-25 mg per tablet TAKE ONE TABLET BY MOUTH ONCE DAILY  Qty: 90 Tab, Refills: 3    Associated Diagnoses: Essential hypertension      albuterol (Proventil HFA) 90 mcg/actuation inhaler INHALE TWO PUFFS BY MOUTH EVERY 4 HOURS AS NEEDED FOR WHEEZING FOR  UP  TO  360  DAYS  Qty: 6 Inhaler, Refills: 2      DULoxetine (CYMBALTA) 60 mg capsule TAKE 1 CAPSULE BY MOUTH TWICE A DAY  Qty: 60 Cap, Refills: 5    Associated Diagnoses: Fibromyalgia      ondansetron (ZOFRAN ODT) 4 mg disintegrating tablet DISSOLVE ONE TABLET ON TONGUE  EVERY 8 HOURS AS NEEDED  Qty: 15 Tab, Refills: 2    Comments: Please consider 90 day supplies to promote better adherence  Associated Diagnoses: Nausea      buPROPion XL (WELLBUTRIN XL) 150 mg tablet Take 1 Tab by mouth every morning. Qty: 30 Tab, Refills: 5      amLODIPine (NORVASC) 5 mg tablet Take 1 Tab by mouth daily. Qty: 30 Tab, Refills: 5      cyclobenzaprine (Flexeril) 10 mg tablet Take 1 Tab by mouth nightly. Qty: 90 Tab, Refills: 1    Associated Diagnoses: Fibromyalgia      diclofenac EC (VOLTAREN) 75 mg EC tablet Take 1 Tab by mouth two (2) times a day. For pain  Qty: 60 Tab, Refills: 2      predniSONE (STERAPRED DS) 10 mg dose pack See administration instruction per 10mg dose pack - 6 days  Qty: 21 Tab, Refills: 0           2. Follow-up Information    None       3. Return to ED if worse   4. Current Discharge Medication List            Diagnosis     Clinical Impression: Thoracic myofascial back pain  Attestations:    Adriana Gonzalez MD    Please note that this dictation was completed with [employer], the computer voice recognition software. Quite often unanticipated grammatical, syntax, homophones, and other interpretive errors are inadvertently transcribed by the computer software. Please disregard these errors. Please excuse any errors that have escaped final proofreading. Thank you.

## 2021-03-22 NOTE — ED TRIAGE NOTES
Pt reports being rushed to ER last Wednesday for unresponsiveness. Pt states a  started CPR on her pta. Pt is reporting sever right sided flank and back pain rated at 10/10. Pt is also reporting bruises to her left arm and leg as well as right arm. Pt states pain is uncontrollable.

## 2021-04-02 ENCOUNTER — APPOINTMENT (OUTPATIENT)
Dept: GENERAL RADIOLOGY | Age: 46
End: 2021-04-02
Attending: FAMILY MEDICINE
Payer: COMMERCIAL

## 2021-04-02 ENCOUNTER — HOSPITAL ENCOUNTER (EMERGENCY)
Age: 46
Discharge: HOME | End: 2021-04-02
Attending: FAMILY MEDICINE
Payer: COMMERCIAL

## 2021-04-02 VITALS
SYSTOLIC BLOOD PRESSURE: 182 MMHG | BODY MASS INDEX: 53.92 KG/M2 | WEIGHT: 293 LBS | DIASTOLIC BLOOD PRESSURE: 102 MMHG | TEMPERATURE: 98 F | OXYGEN SATURATION: 100 % | HEART RATE: 99 BPM | RESPIRATION RATE: 19 BRPM | HEIGHT: 62 IN

## 2021-04-02 DIAGNOSIS — S20.211A CONTUSION OF RIB ON RIGHT SIDE, INITIAL ENCOUNTER: Primary | ICD-10-CM

## 2021-04-02 PROCEDURE — 99281 EMR DPT VST MAYX REQ PHY/QHP: CPT

## 2021-04-02 PROCEDURE — 71100 X-RAY EXAM RIBS UNI 2 VIEWS: CPT

## 2021-04-02 NOTE — ED NOTES
I have reviewed discharge instructions with the patient. The patient verbalized understanding. Pt confirmed understanding of need for follow up with primary care provider. Important sections of discharge instructions highlighted for patient. Para , RN      Pt is not in any current distress and shows no evidence of clinical instability. Pt is hemodynamically/respiratorily stable. Armband removed. Paperwork given by provider and reviewed with patient opportunity for questions/clarification given. Pt denies any additional complaints. Pt ambulated out of ED.     Patient Vitals for the past 4 hrs:   Temp Pulse Resp BP SpO2   04/02/21 1540 98 °F (36.7 °C) 99 19 (!) 182/102 100 %         Para , RN

## 2021-04-02 NOTE — ED TRIAGE NOTES
Pt reports right sided rib pain related to CPR done two weeks ago. Pt states she has been in excruciating pain since. Pt has been seen in ED since for same complaint. Pt reports pain has not been relieved.

## 2021-04-02 NOTE — ED PROVIDER NOTES
EMERGENCY DEPARTMENT HISTORY AND PHYSICAL EXAM      Date: 4/2/2021  Patient Name: Twin Rivas    History of Presenting Illness     Chief Complaint   Patient presents with    Rib Injury       History Provided By:     HPI: Twin Rivas, is an extremely pleasant 54 y.o. female presenting to the ED with a chief complaint of right-sided rib pain. Approximately 2 weeks ago she was seen in the emergency department after she was thought to be unconscious. CPR was started at her home and through a combination of events that involved ammonia, Narcan and stimulation she woke up spontaneously. Since this time she states she has had right-sided rib pain. She states it is tender when she presses on the right side of her ribs. She denies any chest pain or shortness of breath. She denies weakness, dizziness nor confusion. She denies fevers, chills or rigors and otherwise feels well. There are no other complaints, changes, or physical findings at this time. PCP: Shy Bledsoe NP    No current facility-administered medications on file prior to encounter. Current Outpatient Medications on File Prior to Encounter   Medication Sig Dispense Refill    LORazepam (Ativan) 0.5 mg tablet Take 2 Tabs by mouth every eight (8) hours as needed for Anxiety. Max Daily Amount: 3 mg. 15 Tab 0    ibuprofen (MOTRIN) 800 mg tablet Take 1 Tab by mouth every eight (8) hours as needed for Pain. 90 Tab 1    LORazepam (ATIVAN) 1 mg tablet TAKE 1 TABLET BY MOUTH TWICE DAILY AS NEEDED FOR ANXIETY AND FOR SLEEP MAX  DAILY  AMOUNT  2MG.   MUST  LAST  30  DAYS 45 Tab 0    losartan-hydroCHLOROthiazide (HYZAAR) 100-25 mg per tablet TAKE ONE TABLET BY MOUTH ONCE DAILY 90 Tab 3    albuterol (Proventil HFA) 90 mcg/actuation inhaler INHALE TWO PUFFS BY MOUTH EVERY 4 HOURS AS NEEDED FOR WHEEZING FOR  UP  TO  360  DAYS 6 Inhaler 2    DULoxetine (CYMBALTA) 60 mg capsule TAKE 1 CAPSULE BY MOUTH TWICE A DAY 60 Cap 5    ondansetron (ZOFRAN ODT) 4 mg disintegrating tablet DISSOLVE ONE TABLET ON TONGUE  EVERY 8 HOURS AS NEEDED 15 Tab 2    buPROPion XL (WELLBUTRIN XL) 150 mg tablet Take 1 Tab by mouth every morning. 30 Tab 5    amLODIPine (NORVASC) 5 mg tablet Take 1 Tab by mouth daily. 30 Tab 5    cyclobenzaprine (Flexeril) 10 mg tablet Take 1 Tab by mouth nightly. 90 Tab 1    diclofenac EC (VOLTAREN) 75 mg EC tablet Take 1 Tab by mouth two (2) times a day. For pain 60 Tab 2    predniSONE (STERAPRED DS) 10 mg dose pack See administration instruction per 10mg dose pack - 6 days 21 Tab 0       Past History     Past Medical History:  Past Medical History:   Diagnosis Date    Asthma     Chronic pain     Depression     Fibromyalgia     Hypertension        Past Surgical History:  Past Surgical History:   Procedure Laterality Date    HX CHOLECYSTECTOMY      HX GYN         Family History:  Family History   Problem Relation Age of Onset    Elevated Lipids Mother     Arthritis-rheumatoid Mother        Social History:  Social History     Tobacco Use    Smoking status: Never Smoker    Smokeless tobacco: Never Used   Substance Use Topics    Alcohol use: No    Drug use: No       Allergies:  No Known Allergies      Review of Systems     Review of Systems   Constitutional: Negative for activity change, appetite change, chills, fatigue and fever. HENT: Negative for congestion and sore throat. Eyes: Negative for photophobia and visual disturbance. Respiratory: Negative for cough, shortness of breath and wheezing. Cardiovascular: Negative for chest pain, palpitations and leg swelling. Gastrointestinal: Negative for abdominal pain, diarrhea, nausea and vomiting. Endocrine: Negative for cold intolerance and heat intolerance. Musculoskeletal: Negative for gait problem and joint swelling. Right-sided rib pain   Skin: Negative for color change and rash. Neurological: Negative for dizziness and headaches.        Physical Exam Physical Exam  Constitutional:       Appearance: She is well-developed. HENT:      Head: Normocephalic and atraumatic. Mouth/Throat:      Mouth: Mucous membranes are moist.      Pharynx: Oropharynx is clear. Eyes:      Conjunctiva/sclera: Conjunctivae normal.      Pupils: Pupils are equal, round, and reactive to light. Neck:      Musculoskeletal: Normal range of motion and neck supple. Cardiovascular:      Rate and Rhythm: Normal rate and regular rhythm. Heart sounds: No murmur. Pulmonary:      Effort: No respiratory distress. Breath sounds: No stridor. No wheezing, rhonchi or rales. Comments: Bilateral breath sounds present  Abdominal:      General: There is no distension. Tenderness: There is no abdominal tenderness. There is no rebound. Musculoskeletal:      Comments: Right-sided rib pain with palpation of the ribs 7 through 10. No deformity, no crepitus   Skin:     General: Skin is warm and dry. Neurological:      General: No focal deficit present. Mental Status: She is alert and oriented to person, place, and time. Psychiatric:         Mood and Affect: Mood normal.         Behavior: Behavior normal.         Lab and Diagnostic Study Results     Labs -   No results found for this or any previous visit (from the past 12 hour(s)). Radiologic Studies -   @lastxrresult@  CT Results  (Last 48 hours)    None        CXR Results  (Last 48 hours)    None            Medical Decision Making   - I am the first provider for this patient. - I reviewed the vital signs, available nursing notes, past medical history, past surgical history, family history and social history. - Initial assessment performed. The patients presenting problems have been discussed, and they are in agreement with the care plan formulated and outlined with them. I have encouraged them to ask questions as they arise throughout their visit. Vital Signs-Reviewed the patient's vital signs.   Patient Vitals for the past 12 hrs:   Temp Pulse Resp BP SpO2   04/02/21 1540 98 °F (36.7 °C) 99 19 (!) 182/102 100 %         ED Course/ Provider Notes (Medical Decision Making):     Patient presented to the emergency department with a chief complaint of right-sided rib pain. She has reproducible tenderness with palpation along the right rib cage. Bilateral breath sounds present. Right-sided rib x-rays demonstrate no acute fracture. She was ultimately discharged home in stable and ambulatory condition. Jayne Alejandro was given a thorough list of signs and symptoms that would warrant an immediate return to the emergency department. Otherwise Jayne Alejandro will follow up with PCP. Procedures   Medical Decision Makingedical Decision Making  Performed by: Yoan Harris,   Procedures  None       Disposition   Disposition:     Home    All of the diagnostic tests were reviewed and questions answered. Diagnosis, care plan and treatment options were discussed. The patient understands the instructions and will follow up as directed. The patients results have been reviewed with them. They have been counseled regarding their diagnosis. The patient verbally convey understanding and agreement of the signs, symptoms, diagnosis, treatment and prognosis and additionally agrees to follow up as recommended with their PCP in 24 - 48 hours. They also agree with the care-plan and convey that all of their questions have been answered. I have also put together some discharge instructions for them that include: 1) educational information regarding their diagnosis, 2) how to care for their diagnosis at home, as well a 3) list of reasons why they would want to return to the ED prior to their follow-up appointment, should their condition change. DISCHARGE PLAN:    1. Current Discharge Medication List      CONTINUE these medications which have NOT CHANGED    Details   !!  LORazepam (Ativan) 0.5 mg tablet Take 2 Tabs by mouth every eight (8) hours as needed for Anxiety. Max Daily Amount: 3 mg. Qty: 15 Tab, Refills: 0    Associated Diagnoses: Acute right-sided thoracic back pain      ibuprofen (MOTRIN) 800 mg tablet Take 1 Tab by mouth every eight (8) hours as needed for Pain. Qty: 90 Tab, Refills: 1    Associated Diagnoses: Fibromyalgia      !! LORazepam (ATIVAN) 1 mg tablet TAKE 1 TABLET BY MOUTH TWICE DAILY AS NEEDED FOR ANXIETY AND FOR SLEEP MAX  DAILY  AMOUNT  2MG. MUST  LAST  30  DAYS  Qty: 45 Tab, Refills: 0    Associated Diagnoses: Insomnia, unspecified type; Anxiety      losartan-hydroCHLOROthiazide (HYZAAR) 100-25 mg per tablet TAKE ONE TABLET BY MOUTH ONCE DAILY  Qty: 90 Tab, Refills: 3    Associated Diagnoses: Essential hypertension      albuterol (Proventil HFA) 90 mcg/actuation inhaler INHALE TWO PUFFS BY MOUTH EVERY 4 HOURS AS NEEDED FOR WHEEZING FOR  UP  TO  360  DAYS  Qty: 6 Inhaler, Refills: 2      DULoxetine (CYMBALTA) 60 mg capsule TAKE 1 CAPSULE BY MOUTH TWICE A DAY  Qty: 60 Cap, Refills: 5    Associated Diagnoses: Fibromyalgia      ondansetron (ZOFRAN ODT) 4 mg disintegrating tablet DISSOLVE ONE TABLET ON TONGUE  EVERY 8 HOURS AS NEEDED  Qty: 15 Tab, Refills: 2    Comments: Please consider 90 day supplies to promote better adherence  Associated Diagnoses: Nausea      buPROPion XL (WELLBUTRIN XL) 150 mg tablet Take 1 Tab by mouth every morning. Qty: 30 Tab, Refills: 5      amLODIPine (NORVASC) 5 mg tablet Take 1 Tab by mouth daily. Qty: 30 Tab, Refills: 5      cyclobenzaprine (Flexeril) 10 mg tablet Take 1 Tab by mouth nightly. Qty: 90 Tab, Refills: 1    Associated Diagnoses: Fibromyalgia      diclofenac EC (VOLTAREN) 75 mg EC tablet Take 1 Tab by mouth two (2) times a day. For pain  Qty: 60 Tab, Refills: 2      predniSONE (STERAPRED DS) 10 mg dose pack See administration instruction per 10mg dose pack - 6 days  Qty: 21 Tab, Refills: 0       !! - Potential duplicate medications found.  Please discuss with provider. 2.   Follow-up Information     Follow up With Specialties Details Why Contact Info    Cesar Miller NP Family Medicine Schedule an appointment as soon as possible for a visit   [address] [place] [address] [phone]              3.  Return to ED if worse       4. Current Discharge Medication List            Diagnosis     Clinical Impression:    1. Contusion of rib on right side, initial encounter        Attestations:    Terrence Record, DO    Please note that this dictation was completed with [employer], the computer voice recognition software. Quite often unanticipated grammatical, syntax, homophones, and other interpretive errors are inadvertently transcribed by the computer software. Please disregard these errors. Please excuse any errors that have escaped final proofreading. Thank you.

## 2021-05-05 ENCOUNTER — VIRTUAL VISIT (OUTPATIENT)
Dept: FAMILY MEDICINE CLINIC | Age: 46
End: 2021-05-05
Payer: MEDICAID

## 2021-05-05 DIAGNOSIS — G89.29 CHRONIC BILATERAL LOW BACK PAIN WITHOUT SCIATICA: ICD-10-CM

## 2021-05-05 DIAGNOSIS — M79.7 FIBROMYALGIA: Primary | ICD-10-CM

## 2021-05-05 DIAGNOSIS — M54.50 CHRONIC BILATERAL LOW BACK PAIN WITHOUT SCIATICA: ICD-10-CM

## 2021-05-05 DIAGNOSIS — G89.4 CHRONIC PAIN SYNDROME: ICD-10-CM

## 2021-05-05 PROCEDURE — 99213 OFFICE O/P EST LOW 20 MIN: CPT | Performed by: NURSE PRACTITIONER

## 2021-05-05 NOTE — PROGRESS NOTES
Chief Complaint   Patient presents with    Swelling     Pt being seen today for swelling in her lower extremities  -pt reports this has been going on for months and that she is having throbbing pain  -pt has treated with ibuprofen and tylenol    1. Have you been to the ER, urgent care clinic since your last visit? Hospitalized since your last visit? Pt states she went to a hospital in [place] [address]   2. Have you seen or consulted any other health care providers outside of the [address] [place] [address] since your last visit? Include any pap smears or colon screening.  No     Pt has no other concerns

## 2021-05-05 NOTE — PROGRESS NOTES
Kym Rosas (: 1975) is a 54 y.o. female, established patient, here for evaluation of the following chief complaint(s):   Swelling     SUBJECTIVE/OBJECTIVE:  HPI  Patient is having chronic pain of all joints  Believes that the motrin she was taking was causing swelling so she stopped the med  Just received a refill request for flexeril and motrin from pharmacy that was sent in  Patient basically states she wants a narcotic pain med  That the other stuff does not help  Has not seen ortho in years, is morbidly obese and does little to no exercise or movement to help herself  Has not had labs done to eval for RA or other dx    Review of Systems   A comprehensive review of system was obtained and negative except findings in the HPI      No flowsheet data found.     Physical Exam    [INSTRUCTIONS:  \"[x]\" Indicates a positive item  \"[]\" Indicates a negative item  -- DELETE ALL ITEMS NOT EXAMINED]    Constitutional: [x] Appears well-developed and well-nourished [x] No apparent distress      [] Abnormal -     Mental status: [x] Alert and awake  [x] Oriented to person/place/time [x] Able to follow commands    [] Abnormal -     Eyes:   EOM    [x]  Normal    [] Abnormal -   Sclera  [x]  Normal    [] Abnormal -          Discharge [x]  None visible   [] Abnormal -     HENT: [x] Normocephalic, atraumatic  [] Abnormal -   [x] Mouth/Throat: Mucous membranes are moist    External Ears [x] Normal  [] Abnormal -    Neck: [x] No visualized mass [] Abnormal -     Pulmonary/Chest: [x] Respiratory effort normal   [x] No visualized signs of difficulty breathing or respiratory distress        [] Abnormal -      Musculoskeletal:   [x] Normal gait with no signs of ataxia         [x] Normal range of motion of neck        [] Abnormal -     Neurological:        [x] No Facial Asymmetry (Cranial nerve 7 motor function) (limited exam due to video visit)          [x] No gaze palsy        [] Abnormal -          Skin:        [x] No significant exanthematous lesions or discoloration noted on facial skin         [] Abnormal -            Psychiatric:       [x] Normal Affect [] Abnormal -        [x] No Hallucinations    Other pertinent observable physical exam findings:- none    Diagnoses and all orders for this visit:    1. Fibromyalgia    2. Chronic bilateral low back pain without sciatica    3. Chronic pain syndrome      I basically told her that she had not been in office in 1 1/2 years  Should make an appt for labs and to eval for RA  Would not write for narcotics because it is not a long term solution for chronic pain  Advised to use the voltaren and flexeril that was sent in        On this date 05/05/2021 I have spent 15 minutes reviewing previous notes, test results and face to face (virtual) with the patient discussing the diagnosis and importance of compliance with the treatment plan as well as documenting on the day of the visit. Esthela Rolando, was evaluated through a synchronous (real-time) audio-video encounter. The patient (or guardian if applicable) is aware that this is a billable service. Verbal consent to proceed has been obtained within the past 12 months. The visit was conducted pursuant to the emergency declaration under the [address] [place] [address] authority and the [employer] and [employer] Act. Patient identification was verified, and a caregiver was present when appropriate. The patient was located in a state where the provider was credentialed to provide care. An electronic signature was used to authenticate this note.   -- Norman Carlson NP

## 2021-05-06 DIAGNOSIS — M54.6 ACUTE RIGHT-SIDED THORACIC BACK PAIN: ICD-10-CM

## 2021-05-06 RX ORDER — LORAZEPAM 0.5 MG/1
1 TABLET ORAL
Qty: 60 TAB | Refills: 1 | Status: SHIPPED | OUTPATIENT
Start: 2021-05-06 | End: 2021-05-11

## 2021-05-09 ENCOUNTER — TELEPHONE (OUTPATIENT)
Dept: FAMILY MEDICINE CLINIC | Age: 46
End: 2021-05-09

## 2021-05-10 NOTE — TELEPHONE ENCOUNTER
Please call the pharmacy where the rx was sent to and find out if this is really the case. This makes no sense. Most insurance co dont' care about directions.  [facility]

## 2021-05-10 NOTE — TELEPHONE ENCOUNTER
Spoke with pt informed I spoke to the pharmacy and her med is ready for p/u.  She verbalized understanding and has an apt with up on the 20th

## 2021-05-10 NOTE — TELEPHONE ENCOUNTER
----- Message from Harrison Irving sent at 5/7/2021 12:59 PM EDT -----  Regarding: Prescription Question  Contact: [phone]  My insurance is not paying for my lorazepam because of the way its written. It should say BID instead of q8 hours. Also can you send it to the [facility] in [place] on [address] please? Thank you. Have a good weekend.

## 2021-05-11 DIAGNOSIS — M54.6 ACUTE RIGHT-SIDED THORACIC BACK PAIN: ICD-10-CM

## 2021-05-11 RX ORDER — LORAZEPAM 1 MG/1
1 TABLET ORAL
Qty: 60 TAB | Refills: 1 | Status: SHIPPED | OUTPATIENT
Start: 2021-05-11 | End: 2021-07-07 | Stop reason: SDUPTHER

## 2021-08-05 DIAGNOSIS — M54.6 ACUTE RIGHT-SIDED THORACIC BACK PAIN: ICD-10-CM

## 2021-08-05 RX ORDER — LORAZEPAM 1 MG/1
1 TABLET ORAL
Qty: 60 TABLET | Refills: 0 | Status: SHIPPED | OUTPATIENT
Start: 2021-08-05 | End: 2021-08-08

## 2021-08-06 DIAGNOSIS — M54.6 ACUTE RIGHT-SIDED THORACIC BACK PAIN: ICD-10-CM

## 2021-08-08 RX ORDER — LORAZEPAM 1 MG/1
TABLET ORAL
Qty: 45 TABLET | Refills: 0 | Status: SHIPPED | OUTPATIENT
Start: 2021-08-08 | End: 2022-02-09 | Stop reason: SDUPTHER

## 2021-09-17 DIAGNOSIS — M79.7 FIBROMYALGIA: ICD-10-CM

## 2021-09-17 DIAGNOSIS — R11.0 NAUSEA: ICD-10-CM

## 2021-09-17 RX ORDER — ONDANSETRON 4 MG/1
TABLET, ORALLY DISINTEGRATING ORAL
Qty: 15 TABLET | Refills: 0 | Status: SHIPPED | OUTPATIENT
Start: 2021-09-17 | End: 2021-11-04 | Stop reason: SDUPTHER

## 2021-09-17 RX ORDER — CYCLOBENZAPRINE HCL 10 MG
10 TABLET ORAL
Qty: 30 TABLET | Refills: 0 | Status: SHIPPED | OUTPATIENT
Start: 2021-09-17 | End: 2022-01-04 | Stop reason: SDUPTHER

## 2021-09-29 RX ORDER — ALBUTEROL SULFATE 90 UG/1
AEROSOL, METERED RESPIRATORY (INHALATION)
Qty: 1 EACH | Refills: 3 | Status: SHIPPED | OUTPATIENT
Start: 2021-09-29 | End: 2021-12-07 | Stop reason: SDUPTHER

## 2021-10-08 DIAGNOSIS — M79.7 FIBROMYALGIA: ICD-10-CM

## 2021-10-10 RX ORDER — DULOXETIN HYDROCHLORIDE 60 MG/1
CAPSULE, DELAYED RELEASE ORAL
Qty: 60 CAPSULE | Refills: 0 | Status: SHIPPED | OUTPATIENT
Start: 2021-10-10 | End: 2021-11-04 | Stop reason: SDUPTHER

## 2021-10-10 RX ORDER — DULOXETIN HYDROCHLORIDE 60 MG/1
CAPSULE, DELAYED RELEASE ORAL
Qty: 60 CAPSULE | Refills: 0 | Status: SHIPPED | OUTPATIENT
Start: 2021-10-10

## 2021-11-04 DIAGNOSIS — R11.0 NAUSEA: ICD-10-CM

## 2021-11-04 DIAGNOSIS — M79.7 FIBROMYALGIA: ICD-10-CM

## 2021-11-04 RX ORDER — DULOXETIN HYDROCHLORIDE 60 MG/1
CAPSULE, DELAYED RELEASE ORAL
Qty: 60 CAPSULE | Refills: 0 | Status: SHIPPED | OUTPATIENT
Start: 2021-11-04 | End: 2021-12-07 | Stop reason: SDUPTHER

## 2021-11-04 RX ORDER — ONDANSETRON 4 MG/1
TABLET, ORALLY DISINTEGRATING ORAL
Qty: 15 TABLET | Refills: 0 | Status: SHIPPED | OUTPATIENT
Start: 2021-11-04 | End: 2021-12-07 | Stop reason: SDUPTHER

## 2021-12-07 DIAGNOSIS — M79.7 FIBROMYALGIA: ICD-10-CM

## 2021-12-07 DIAGNOSIS — R11.0 NAUSEA: ICD-10-CM

## 2021-12-07 RX ORDER — ONDANSETRON 4 MG/1
TABLET, ORALLY DISINTEGRATING ORAL
Qty: 15 TABLET | Refills: 0 | Status: SHIPPED | OUTPATIENT
Start: 2021-12-07 | End: 2022-01-04 | Stop reason: SDUPTHER

## 2021-12-07 RX ORDER — DULOXETIN HYDROCHLORIDE 60 MG/1
CAPSULE, DELAYED RELEASE ORAL
Qty: 60 CAPSULE | Refills: 0 | Status: SHIPPED | OUTPATIENT
Start: 2021-12-07 | End: 2022-01-04 | Stop reason: SDUPTHER

## 2021-12-07 RX ORDER — ALBUTEROL SULFATE 90 UG/1
AEROSOL, METERED RESPIRATORY (INHALATION)
Qty: 1 EACH | Refills: 3 | Status: SHIPPED | OUTPATIENT
Start: 2021-12-07 | End: 2022-01-04 | Stop reason: SDUPTHER

## 2022-01-04 DIAGNOSIS — I10 ESSENTIAL HYPERTENSION: ICD-10-CM

## 2022-01-04 DIAGNOSIS — R11.0 NAUSEA: ICD-10-CM

## 2022-01-04 DIAGNOSIS — M79.7 FIBROMYALGIA: ICD-10-CM

## 2022-01-04 RX ORDER — ONDANSETRON 4 MG/1
TABLET, ORALLY DISINTEGRATING ORAL
Qty: 15 TABLET | Refills: 0 | Status: SHIPPED | OUTPATIENT
Start: 2022-01-04 | End: 2022-02-03 | Stop reason: SDUPTHER

## 2022-01-04 RX ORDER — AMLODIPINE BESYLATE 5 MG/1
5 TABLET ORAL DAILY
Qty: 30 TABLET | Refills: 0 | Status: SHIPPED | OUTPATIENT
Start: 2022-01-04 | End: 2022-02-09 | Stop reason: ALTCHOICE

## 2022-01-04 RX ORDER — DULOXETIN HYDROCHLORIDE 60 MG/1
CAPSULE, DELAYED RELEASE ORAL
Qty: 60 CAPSULE | Refills: 0 | Status: SHIPPED | OUTPATIENT
Start: 2022-01-04 | End: 2022-02-03 | Stop reason: SDUPTHER

## 2022-01-04 RX ORDER — LOSARTAN POTASSIUM AND HYDROCHLOROTHIAZIDE 25; 100 MG/1; MG/1
TABLET ORAL
Qty: 90 TABLET | Refills: 3 | Status: SHIPPED | OUTPATIENT
Start: 2022-01-04 | End: 2022-08-31 | Stop reason: ALTCHOICE

## 2022-01-04 RX ORDER — ALBUTEROL SULFATE 90 UG/1
AEROSOL, METERED RESPIRATORY (INHALATION)
Qty: 1 EACH | Refills: 3 | Status: SHIPPED | OUTPATIENT
Start: 2022-01-04 | End: 2022-04-07 | Stop reason: SDUPTHER

## 2022-01-04 RX ORDER — CYCLOBENZAPRINE HCL 10 MG
10 TABLET ORAL
Qty: 30 TABLET | Refills: 0 | Status: SHIPPED | OUTPATIENT
Start: 2022-01-04 | End: 2022-04-07 | Stop reason: SDUPTHER

## 2022-01-18 ENCOUNTER — HOSPITAL ENCOUNTER (EMERGENCY)
Age: 47
Discharge: HOME | End: 2022-01-18
Payer: MEDICAID

## 2022-01-18 ENCOUNTER — APPOINTMENT (OUTPATIENT)
Dept: CT IMAGING | Age: 47
End: 2022-01-18
Attending: PHYSICIAN ASSISTANT
Payer: MEDICAID

## 2022-01-18 VITALS
WEIGHT: 293 LBS | HEIGHT: 62 IN | BODY MASS INDEX: 53.92 KG/M2 | SYSTOLIC BLOOD PRESSURE: 176 MMHG | RESPIRATION RATE: 16 BRPM | TEMPERATURE: 98.1 F | DIASTOLIC BLOOD PRESSURE: 98 MMHG | HEART RATE: 92 BPM | OXYGEN SATURATION: 96 %

## 2022-01-18 DIAGNOSIS — S30.0XXA LUMBAR CONTUSION, INITIAL ENCOUNTER: Primary | ICD-10-CM

## 2022-01-18 DIAGNOSIS — M43.06 LUMBAR SPONDYLOLYSIS: ICD-10-CM

## 2022-01-18 DIAGNOSIS — R03.0 ELEVATED BLOOD PRESSURE READING: ICD-10-CM
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
## 2022-01-18 PROCEDURE — 74011250637 HC RX REV CODE- 250/637: Performed by: NURSE PRACTITIONER

## 2022-01-18 PROCEDURE — 72131 CT LUMBAR SPINE W/O DYE: CPT

## 2022-01-18 PROCEDURE — 99283 EMERGENCY DEPT VISIT LOW MDM: CPT

## 2022-01-18 RX ORDER — OXYCODONE AND ACETAMINOPHEN 5; 325 MG/1; MG/1
1 TABLET ORAL
Status: COMPLETED | OUTPATIENT
Start: 2022-01-18 | End: 2022-01-18

## 2022-01-18 RX ORDER — METHYLPREDNISOLONE 4 MG/1
TABLET ORAL
Qty: 1 DOSE PACK | Refills: 0 | Status: SHIPPED | OUTPATIENT
Start: 2022-01-18 | End: 2022-02-09 | Stop reason: ALTCHOICE

## 2022-01-18 RX ORDER — HYDROCODONE BITARTRATE AND ACETAMINOPHEN 5; 325 MG/1; MG/1
1 TABLET ORAL
Qty: 12 TABLET | Refills: 0 | Status: SHIPPED | OUTPATIENT
Start: 2022-01-18 | End: 2022-01-21

## 2022-01-18 RX ORDER — OXYCODONE AND ACETAMINOPHEN 10; 325 MG/1; MG/1
1 TABLET ORAL ONCE
Status: DISCONTINUED | OUTPATIENT
Start: 2022-01-18 | End: 2022-01-18

## 2022-01-18 RX ORDER — CLONIDINE HYDROCHLORIDE 0.1 MG/1
0.2 TABLET ORAL
Status: COMPLETED | OUTPATIENT
Start: 2022-01-18 | End: 2022-01-18

## 2022-01-18 RX ADMIN — CLONIDINE HYDROCHLORIDE 0.2 MG: 0.1 TABLET ORAL at 19:01

## 2022-01-18 RX ADMIN — OXYCODONE HYDROCHLORIDE AND ACETAMINOPHEN 1 TABLET: 5; 325 TABLET ORAL at 19:01
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
## 2022-01-18 NOTE — Clinical Note
[facility] EMERGENCY DEPT  [address] [place] [id number]  [phone]    Work/School Note    Date: 1/18/2022    To Whom It May concern:    Gianni Claros was seen and treated today in the emergency room by the following provider(s):  Physician Assistant: Papito Grimes PA-C  Nurse Practitioner: David Ambriz NP. Gianni Claros is excused from work/school on 1/18/2022 through 1/20/2022. She is medically clear to return to work/school on 1/21/2022.          Sincerely,          Yolanda Erazo RN

## 2022-01-18 NOTE — Clinical Note
[facility]ERGENCY DEPT  [place] [id number]  [phone]    Work/School Note    Date: 1/18/2022    To Whom It May concern:    Kobi Balloon was seen and treated today in the emergency room by the following provider(s):  Physician Assistant: Lowell Oppenheim, PA-C  Nurse Practitioner: Spiek Meadows NP. Kobi Balloon is excused from work/school on 1/18/2022 through 1/20/2022. She is medically clear to return to work/school on 1/21/2022.          Sincerely,          Marcos Casper RN
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
## 2022-01-18 NOTE — Clinical Note
[facility] EMERGENCY DEPT  [address] [address]  [phone]    Work/School Note    Date: 1/18/2022    To Whom It May concern:    Estephania Black was seen and treated today in the emergency room by the following provider(s):  Physician Assistant: Gissel Lugo PA-C. Estephania Black is excused from work/school on 1/18/2022 through 1/20/2022. She is medically clear to return to work/school on 1/21/2022.          Sincerely,          Shaw Monge PA-C

## 2022-02-03 DIAGNOSIS — R11.0 NAUSEA: ICD-10-CM

## 2022-02-03 DIAGNOSIS — M79.7 FIBROMYALGIA: ICD-10-CM

## 2022-02-06 RX ORDER — ONDANSETRON 4 MG/1
TABLET, ORALLY DISINTEGRATING ORAL
Qty: 15 TABLET | Refills: 0 | Status: SHIPPED | OUTPATIENT
Start: 2022-02-06 | End: 2022-02-09 | Stop reason: ALTCHOICE

## 2022-02-06 RX ORDER — DULOXETIN HYDROCHLORIDE 60 MG/1
CAPSULE, DELAYED RELEASE ORAL
Qty: 60 CAPSULE | Refills: 0 | Status: SHIPPED | OUTPATIENT
Start: 2022-02-06 | End: 2022-04-07 | Stop reason: SDUPTHER

## 2022-02-09 ENCOUNTER — VIRTUAL VISIT (OUTPATIENT)
Dept: FAMILY MEDICINE CLINIC | Age: 47
End: 2022-02-09
Payer: MEDICAID

## 2022-02-09 DIAGNOSIS — E11.65 CONTROLLED TYPE 2 DIABETES MELLITUS WITH HYPERGLYCEMIA, WITHOUT LONG-TERM CURRENT USE OF INSULIN (HCC): ICD-10-CM

## 2022-02-09 DIAGNOSIS — R32 URINARY INCONTINENCE, UNSPECIFIED TYPE: ICD-10-CM

## 2022-02-09 DIAGNOSIS — M54.6 ACUTE RIGHT-SIDED THORACIC BACK PAIN: Primary | ICD-10-CM

## 2022-02-09 DIAGNOSIS — R41.840 POOR CONCENTRATION: ICD-10-CM

## 2022-02-09 PROCEDURE — 99214 OFFICE O/P EST MOD 30 MIN: CPT | Performed by: NURSE PRACTITIONER

## 2022-02-09 RX ORDER — BLOOD-GLUCOSE METER
EACH MISCELLANEOUS
Qty: 1 EACH | Refills: 0 | Status: SHIPPED | OUTPATIENT
Start: 2022-02-09 | End: 2022-08-31 | Stop reason: ALTCHOICE

## 2022-02-09 RX ORDER — LORAZEPAM 1 MG/1
TABLET ORAL
Qty: 45 TABLET | Refills: 0 | Status: SHIPPED | OUTPATIENT
Start: 2022-02-09 | End: 2022-04-07 | Stop reason: SDUPTHER

## 2022-02-09 RX ORDER — METOPROLOL SUCCINATE 50 MG/1
50 TABLET, EXTENDED RELEASE ORAL DAILY
Qty: 90 TABLET | Refills: 3 | Status: SHIPPED | OUTPATIENT
Start: 2022-02-09 | End: 2022-08-31 | Stop reason: ALTCHOICE

## 2022-02-09 RX ORDER — LANCETS
EACH MISCELLANEOUS
Qty: 100 EACH | Refills: 11 | Status: SHIPPED | OUTPATIENT
Start: 2022-02-09 | End: 2022-08-31 | Stop reason: ALTCHOICE

## 2022-02-09 RX ORDER — BLOOD SUGAR DIAGNOSTIC
STRIP MISCELLANEOUS
Qty: 100 STRIP | Refills: 5 | Status: SHIPPED | OUTPATIENT
Start: 2022-02-09 | End: 2022-08-31 | Stop reason: ALTCHOICE

## 2022-02-09 NOTE — PROGRESS NOTES
Ann Marie Gill (: 1975) is a 51 y.o. female, established patient, here for evaluation of the following chief complaint(s):   Hospital Follow Up [facility] for Back Injury on 22)       ASSESSMENT/PLAN:  Below is the assessment and plan developed based on review of pertinent history, labs, studies, and medications. Diagnoses and all orders for this visit:    1. Acute right-sided thoracic back pain  -     LORazepam (ATIVAN) 1 mg tablet; TAKE 1 TABLET BY MOUTH TWICE DAILY AS NEEDED FOR  ANXIETY  AND  FOR  SLEEP  MAX  DAILY  AMOUNT  2  MG  MUST  LAST  30  DAYS    2. Controlled type 2 diabetes mellitus with hyperglycemia, without long-term current use of insulin ([facility] [address]    3. Urinary incontinence, unspecified type    4. Poor concentration    Other orders  -     metoprolol succinate (TOPROL-XL) 50 mg XL tablet; Take 1 Tablet by mouth daily.  -     glucose blood VI test strips (OneTouch Verio test strips) strip; Twice a day monitoring dx: E11.9  -     Blood-Glucose Meter (OneTouch Verio Meter) misc; Twice a day monitoring dx: E11.9  -     lancets misc;  Twice a day monitoring dx: E11.9    given toprol to take for bp  Given monitor and strips for bs monitoring  Referral given to Dr. Mickey Roy for ADD testing      SUBJECTIVE/OBJECTIVE:  HPI  Having spasms of the low back, ativan given at ER not helping  Feels like she can't concentrate and would like to have ADD testing, chris bad at work  Having nocturia and polyuria during the day, sugar is high but was never told   Did not know she had DM  Not on meds    Review of Systems   A comprehensive review of system was obtained and negative except findings in the HPI    No data recorded     Physical Exam    [INSTRUCTIONS:  \"[x]\" Indicates a positive item  \"[]\" Indicates a negative item  -- DELETE ALL ITEMS NOT EXAMINED]    Constitutional: [x] Appears well-developed and well-nourished [x] No apparent distress      [] Abnormal -     Mental status: [x] Alert and awake  [x] Oriented to person/place/time [x] Able to follow commands    [] Abnormal -     Eyes:   EOM    [x]  Normal    [] Abnormal -   Sclera  [x]  Normal    [] Abnormal -          Discharge [x]  None visible   [] Abnormal -     HENT: [x] Normocephalic, atraumatic  [] Abnormal -   [x] Mouth/Throat: Mucous membranes are moist    External Ears [x] Normal  [] Abnormal -    Neck: [x] No visualized mass [] Abnormal -     Pulmonary/Chest: [x] Respiratory effort normal   [x] No visualized signs of difficulty breathing or respiratory distress        [] Abnormal -      Musculoskeletal:   [x] Normal gait with no signs of ataxia         [x] Normal range of motion of neck        [] Abnormal -     Neurological:        [x] No Facial Asymmetry (Cranial nerve 7 motor function) (limited exam due to video visit)          [x] No gaze palsy        [] Abnormal -          Skin:        [x] No significant exanthematous lesions or discoloration noted on facial skin         [] Abnormal -            Psychiatric:       [x] Normal Affect [] Abnormal -        [x] No Hallucinations    Other pertinent observable physical exam findings:-        On this date 02/09/2022 I have spent 25 minutes reviewing previous notes, test results and face to face (virtual) with the patient discussing the diagnosis and importance of compliance with the treatment plan as well as documenting on the day of the visit. Hailey Gutiérrez was evaluated through a synchronous (real-time) audio-video encounter. The patient (or guardian if applicable) is aware that this is a billable service, which includes applicable co-pays. Verbal consent to proceed has been obtained. The visit was conducted pursuant to the emergency declaration under the [address] [place] [address], [address] [place] [address] and the [employer] and [employer] General Act. Patient identification was verified, and a caregiver was present when appropriate.  The patient was located at home in a state where the provider was licensed to provide care. An electronic signature was used to authenticate this note.   -- Jess Coffman NP

## 2022-03-19 PROBLEM — E66.01 OBESITY, MORBID (HCC): Status: ACTIVE | Noted: 2018-04-25

## 2022-04-07 DIAGNOSIS — M54.6 ACUTE RIGHT-SIDED THORACIC BACK PAIN: ICD-10-CM

## 2022-04-07 DIAGNOSIS — M79.7 FIBROMYALGIA: ICD-10-CM

## 2022-04-08 DIAGNOSIS — M79.7 FIBROMYALGIA: ICD-10-CM

## 2022-04-11 RX ORDER — ALBUTEROL SULFATE 90 UG/1
AEROSOL, METERED RESPIRATORY (INHALATION)
Qty: 1 EACH | Refills: 3 | Status: SHIPPED | OUTPATIENT
Start: 2022-04-11 | End: 2022-05-09 | Stop reason: SDUPTHER

## 2022-04-11 RX ORDER — CYCLOBENZAPRINE HCL 10 MG
10 TABLET ORAL
Qty: 30 TABLET | Refills: 0 | Status: SHIPPED | OUTPATIENT
Start: 2022-04-11 | End: 2022-05-09 | Stop reason: SDUPTHER

## 2022-04-11 RX ORDER — LORAZEPAM 1 MG/1
TABLET ORAL
Qty: 45 TABLET | Refills: 0 | Status: SHIPPED | OUTPATIENT
Start: 2022-04-11 | End: 2022-05-09 | Stop reason: SDUPTHER

## 2022-04-11 RX ORDER — DULOXETIN HYDROCHLORIDE 60 MG/1
CAPSULE, DELAYED RELEASE ORAL
Qty: 60 CAPSULE | Refills: 0 | Status: SHIPPED | OUTPATIENT
Start: 2022-04-11 | End: 2022-08-31 | Stop reason: ALTCHOICE

## 2022-04-11 RX ORDER — DULOXETIN HYDROCHLORIDE 60 MG/1
CAPSULE, DELAYED RELEASE ORAL
Qty: 60 CAPSULE | Refills: 0 | Status: SHIPPED | OUTPATIENT
Start: 2022-04-11 | End: 2022-05-09 | Stop reason: SDUPTHER

## 2022-05-09 DIAGNOSIS — M54.6 ACUTE RIGHT-SIDED THORACIC BACK PAIN: ICD-10-CM

## 2022-05-09 DIAGNOSIS — M79.7 FIBROMYALGIA: ICD-10-CM

## 2022-05-09 RX ORDER — LORAZEPAM 1 MG/1
TABLET ORAL
Qty: 45 TABLET | Refills: 0 | Status: SHIPPED | OUTPATIENT
Start: 2022-05-09 | End: 2022-06-13 | Stop reason: SDUPTHER

## 2022-05-09 RX ORDER — DULOXETIN HYDROCHLORIDE 60 MG/1
CAPSULE, DELAYED RELEASE ORAL
Qty: 60 CAPSULE | Refills: 0 | Status: SHIPPED | OUTPATIENT
Start: 2022-05-09 | End: 2022-06-13 | Stop reason: SDUPTHER

## 2022-05-09 RX ORDER — CYCLOBENZAPRINE HCL 10 MG
10 TABLET ORAL
Qty: 30 TABLET | Refills: 0 | Status: SHIPPED | OUTPATIENT
Start: 2022-05-09 | End: 2022-06-13 | Stop reason: SDUPTHER

## 2022-05-09 RX ORDER — ALBUTEROL SULFATE 90 UG/1
AEROSOL, METERED RESPIRATORY (INHALATION)
Qty: 1 EACH | Refills: 3 | Status: SHIPPED | OUTPATIENT
Start: 2022-05-09 | End: 2022-07-12 | Stop reason: SDUPTHER

## 2022-06-13 DIAGNOSIS — M54.6 ACUTE RIGHT-SIDED THORACIC BACK PAIN: ICD-10-CM

## 2022-06-13 DIAGNOSIS — R11.0 NAUSEA: ICD-10-CM

## 2022-06-13 DIAGNOSIS — M79.7 FIBROMYALGIA: ICD-10-CM

## 2022-06-13 RX ORDER — LORAZEPAM 1 MG/1
TABLET ORAL
Qty: 45 TABLET | Refills: 0 | Status: SHIPPED | OUTPATIENT
Start: 2022-06-13 | End: 2022-08-31 | Stop reason: SDUPTHER

## 2022-06-13 RX ORDER — ONDANSETRON 4 MG/1
TABLET, ORALLY DISINTEGRATING ORAL
Qty: 15 TABLET | Refills: 0 | Status: SHIPPED | OUTPATIENT
Start: 2022-06-13 | End: 2022-07-12 | Stop reason: SDUPTHER

## 2022-06-13 RX ORDER — DULOXETIN HYDROCHLORIDE 60 MG/1
CAPSULE, DELAYED RELEASE ORAL
Qty: 60 CAPSULE | Refills: 0 | Status: SHIPPED | OUTPATIENT
Start: 2022-06-13 | End: 2022-07-12 | Stop reason: SDUPTHER

## 2022-06-13 RX ORDER — CYCLOBENZAPRINE HCL 10 MG
10 TABLET ORAL
Qty: 30 TABLET | Refills: 0 | Status: SHIPPED | OUTPATIENT
Start: 2022-06-13

## 2022-08-31 ENCOUNTER — VIRTUAL VISIT (OUTPATIENT)
Dept: FAMILY MEDICINE CLINIC | Age: 47
End: 2022-08-31
Payer: MEDICAID

## 2022-08-31 DIAGNOSIS — F41.9 ANXIETY: Primary | ICD-10-CM

## 2022-08-31 DIAGNOSIS — U07.1 COVID-19: ICD-10-CM

## 2022-08-31 PROCEDURE — 99214 OFFICE O/P EST MOD 30 MIN: CPT | Performed by: NURSE PRACTITIONER

## 2022-08-31 RX ORDER — ARIPIPRAZOLE 2 MG/1
2 TABLET ORAL DAILY
Qty: 30 TABLET | Refills: 5 | Status: SHIPPED | OUTPATIENT
Start: 2022-08-31

## 2022-08-31 RX ORDER — DEXTROMETHORPHAN HYDROBROMIDE AND GUAIFENESIN 20; 400 MG/20ML; MG/20ML
20 SOLUTION ORAL
Qty: 118 ML | Refills: 1 | Status: SHIPPED | OUTPATIENT
Start: 2022-08-31

## 2022-08-31 RX ORDER — LORAZEPAM 1 MG/1
TABLET ORAL
Qty: 45 TABLET | Refills: 0 | Status: SHIPPED | OUTPATIENT
Start: 2022-08-31 | End: 2022-09-28

## 2022-08-31 NOTE — PROGRESS NOTES
Chief Complaint   Patient presents with    Post-COVID Symptoms     Pt being seen for cough after having covid 4 weeks ago  -pt states that her taste is not all the way back and she also states she still has SOB      **check depression screening     1. Have you been to the ER, urgent care clinic since your last visit? Hospitalized since your last visit? No    2. Have you seen or consulted any other health care providers outside of the [address] [place] [address] since your last visit? Include any pap smears or colon screening.  No    Pt has no other concerns

## 2022-08-31 NOTE — PROGRESS NOTES
Kalyani Monteiro (: 1975) is a 51 y.o. female, established patient, here for evaluation of the following chief complaint(s):   Post-COVID Symptoms       ASSESSMENT/PLAN:  Below is the assessment and plan developed based on review of pertinent history, labs, studies, and medications. 1. Anxiety  -     ARIPiprazole (ABILIFY) 2 mg tablet; Take 1 Tablet by mouth daily. - 7 pm, Normal, Disp-30 Tablet, R-5  -     LORazepam (ATIVAN) 1 mg tablet; TAKE 1 TABLET BY MOUTH TWICE DAILY AS NEEDED FOR  ANXIETY  AND  FOR  SLEEP  MAX  DAILY  AMOUNT  2  MG  MUST  LAST  30  DAYS, Normal, Disp-45 Tablet, R-0  2. COVID-19  -     dextromethorphan-guaiFENesin (Mucinex Fast-Max DM Max) 5-100 mg/5 mL liqd; Take 20 mL by mouth three (3) times daily as needed for Cough or Congestion. , Normal, Disp-118 mL, R-1    Added Mucinex DM Max to her regimen for cough  Follow up 2-3 weeks if continues  Mychart message in 1 week with Abilify/mood progress as well    No follow-ups on file.     SUBJECTIVE/OBJECTIVE:  HPI  She was dx with covid 4 weeks ago  Sense of taste is still off and cough continues  Secretions are clear, no fever    Mood is bad, depressed and anxiety  Had to move in with mom due to loss of job and no money  On cymbalta 60mg bid    Review of Systems   A comprehensive review of system was obtained and negative except findings in the HPI      No data recorded     Physical Exam    [INSTRUCTIONS:  \"[x]\" Indicates a positive item  \"[]\" Indicates a negative item  -- DELETE ALL ITEMS NOT EXAMINED]    Constitutional: [x] Appears well-developed and well-nourished [x] No apparent distress      [] Abnormal -     Mental status: [x] Alert and awake  [x] Oriented to person/place/time [x] Able to follow commands    [] Abnormal -     Eyes:   EOM    [x]  Normal    [] Abnormal -   Sclera  [x]  Normal    [] Abnormal -          Discharge [x]  None visible   [] Abnormal -     HENT: [x] Normocephalic, atraumatic  [] Abnormal -   [x] Mouth/Throat: Mucous membranes are moist    External Ears [x] Normal  [] Abnormal -    Neck: [x] No visualized mass [] Abnormal -     Pulmonary/Chest: [x] Respiratory effort normal   [x] No visualized signs of difficulty breathing or respiratory distress        [] Abnormal -      Musculoskeletal:   [x] Normal gait with no signs of ataxia         [x] Normal range of motion of neck        [] Abnormal -     Neurological:        [x] No Facial Asymmetry (Cranial nerve 7 motor function) (limited exam due to video visit)          [x] No gaze palsy        [] Abnormal -          Skin:        [x] No significant exanthematous lesions or discoloration noted on facial skin         [] Abnormal -            Psychiatric:       [x] Normal Affect [] Abnormal -        [x] No Hallucinations    Other pertinent observable physical exam findings:-    On this date 08/31/2022 I have spent 20 minutes reviewing previous notes, test results and face to face (virtual) with the patient discussing the diagnosis and importance of compliance with the treatment plan as well as documenting on the day of the visit. Fran Godoy, was evaluated through a synchronous (real-time) audio-video encounter. The patient (or guardian if applicable) is aware that this is a billable service, which includes applicable co-pays. This Virtual Visit was conducted with patient's (and/or legal guardian's) consent. The visit was conducted pursuant to the emergency declaration under the [address] [place] [address] and the [employer] General Act. Patient identification was verified, and a caregiver was present when appropriate. The patient was located at: Home: [address] [place] [address] [facility]  The provider was located at: Home: [unfilled]       An electronic signature was used to authenticate this note.   -- Randle Hashimoto, NP

## 2022-09-27 DIAGNOSIS — F41.9 ANXIETY: ICD-10-CM

## 2022-09-28 RX ORDER — LORAZEPAM 1 MG/1
TABLET ORAL
Qty: 45 TABLET | Refills: 0 | Status: SHIPPED | OUTPATIENT
Start: 2022-09-28 | End: 2022-10-25

## 2022-09-29 ENCOUNTER — TELEPHONE (OUTPATIENT)
Dept: FAMILY MEDICINE CLINIC | Age: 47
End: 2022-09-29

## 2022-09-30 NOTE — TELEPHONE ENCOUNTER
----- Message from [employer] sent at 9/29/2022  2:33 PM EDT -----  Regarding: Abilify  Hello, I haven't been able to start the Abilify because it needs an Prior Auth for my insurance. I have Filecubed.    Thank you

## 2022-10-04 NOTE — TELEPHONE ENCOUNTER
PA for Aripiprazole 2mg submitted to [facility] via cover my meds,awaiting response.

## 2022-10-25 DIAGNOSIS — F41.9 ANXIETY: ICD-10-CM

## 2022-10-25 RX ORDER — LORAZEPAM 1 MG/1
TABLET ORAL
Qty: 45 TABLET | Refills: 0 | Status: SHIPPED | OUTPATIENT
Start: 2022-10-25 | End: 2022-11-28 | Stop reason: SDUPTHER

## 2022-11-28 DIAGNOSIS — R11.0 NAUSEA: ICD-10-CM

## 2022-11-28 DIAGNOSIS — M79.7 FIBROMYALGIA: ICD-10-CM

## 2022-11-28 DIAGNOSIS — F41.9 ANXIETY: ICD-10-CM

## 2022-11-28 RX ORDER — LORAZEPAM 1 MG/1
TABLET ORAL
Qty: 45 TABLET | Refills: 0 | Status: SHIPPED | OUTPATIENT
Start: 2022-11-28

## 2022-11-28 RX ORDER — ONDANSETRON 4 MG/1
TABLET, ORALLY DISINTEGRATING ORAL
Qty: 15 TABLET | Refills: 0 | Status: SHIPPED | OUTPATIENT
Start: 2022-11-28

## 2022-11-28 RX ORDER — ALBUTEROL SULFATE 90 UG/1
AEROSOL, METERED RESPIRATORY (INHALATION)
Qty: 1 EACH | Refills: 3 | Status: SHIPPED | OUTPATIENT
Start: 2022-11-28

## 2022-11-28 RX ORDER — CYCLOBENZAPRINE HCL 10 MG
10 TABLET ORAL
Qty: 30 TABLET | Refills: 0 | Status: SHIPPED | OUTPATIENT
Start: 2022-11-28

## 2023-01-25 ENCOUNTER — VIRTUAL VISIT (OUTPATIENT)
Dept: FAMILY MEDICINE CLINIC | Age: 48
End: 2023-01-25
Payer: MEDICAID

## 2023-01-25 DIAGNOSIS — M54.42 ACUTE BILATERAL LOW BACK PAIN WITH BILATERAL SCIATICA: Primary | ICD-10-CM

## 2023-01-25 DIAGNOSIS — R11.0 NAUSEA: ICD-10-CM

## 2023-01-25 DIAGNOSIS — M54.41 ACUTE BILATERAL LOW BACK PAIN WITH BILATERAL SCIATICA: Primary | ICD-10-CM

## 2023-01-25 DIAGNOSIS — I10 ESSENTIAL HYPERTENSION: ICD-10-CM

## 2023-01-25 DIAGNOSIS — F41.9 ANXIETY: ICD-10-CM

## 2023-01-25 PROCEDURE — 99214 OFFICE O/P EST MOD 30 MIN: CPT | Performed by: NURSE PRACTITIONER

## 2023-01-25 RX ORDER — HYDROCODONE BITARTRATE AND ACETAMINOPHEN 5; 325 MG/1; MG/1
1 TABLET ORAL
Qty: 30 TABLET | Refills: 0 | Status: SHIPPED | OUTPATIENT
Start: 2023-01-25 | End: 2023-02-24

## 2023-01-25 RX ORDER — VALSARTAN AND HYDROCHLOROTHIAZIDE 160; 12.5 MG/1; MG/1
1 TABLET, FILM COATED ORAL DAILY
Qty: 90 TABLET | Refills: 1 | Status: SHIPPED | OUTPATIENT
Start: 2023-01-25

## 2023-01-25 RX ORDER — LORAZEPAM 1 MG/1
TABLET ORAL
Qty: 45 TABLET | Refills: 0 | Status: SHIPPED | OUTPATIENT
Start: 2023-01-25

## 2023-01-25 RX ORDER — ONDANSETRON 4 MG/1
TABLET, ORALLY DISINTEGRATING ORAL
Qty: 15 TABLET | Refills: 0 | Status: SHIPPED | OUTPATIENT
Start: 2023-01-25

## 2023-01-25 NOTE — PROGRESS NOTES
Chief Complaint   Patient presents with    Back Pain     Pt being seen for back kris  -pt states that she has been having this pain since the 1st of the mo and states it is \"the worst pain\" she has ever been in  -pt states that she has had 4 falls because of this  Pt states she was given lortab but states she has run out    **check depression screening     1. Have you been to the ER, urgent care clinic since your last visit? Hospitalized since your last visit? yes    2. Have you seen or consulted any other health care providers outside of the [address] [place] [address] since your last visit? Include any pap smears or colon screening.  No    Pt has no other concerns

## 2023-01-25 NOTE — PROGRESS NOTES
Bubba Aviles (: 1975) is a 49 y.o. female, established patient, here for evaluation of the following chief complaint(s):   Back Pain       ASSESSMENT/PLAN:  Below is the assessment and plan developed based on review of pertinent history, labs, studies, and medications. Diagnoses and all orders for this visit:    1. Acute bilateral low back pain with bilateral sciatica  -     HYDROcodone-acetaminophen (NORCO) 5-325 mg per tablet; Take 1 Tablet by mouth three (3) times daily as needed for Pain for up to 30 days. Max Daily Amount: 3 Tablets. 2. Nausea  -     ondansetron (ZOFRAN ODT) 4 mg disintegrating tablet; DISSOLVE 1 TABLET IN MOUTH EVERY 8 HOURS AS NEEDED    3. Anxiety  -     LORazepam (ATIVAN) 1 mg tablet; TAKE 1 TABLET BY MOUTH TWICE DAILY AS NEEDED FOR ANXIETY AND SLEEP. DAILY AMOUNT 2 MG MUST LAST 30 DAYS    4. Essential hypertension    Other orders  -     valsartan-hydroCHLOROthiazide (DIOVAN-HCT) 160-12.5 mg per tablet; Take 1 Tablet by mouth daily. Refills updated today but will not fill any meds controlled until seen in person since has been over 2 years  She will message back the name of the [facility] [employer] [facility] and Ortho for referrals    No follow-ups on file.     SUBJECTIVE/OBJECTIVE:  HPI  Pt being seen for back kris  -pt states that she has been having this pain since the  of the mo and states it is \"the worst pain\" she has ever been in  -pt states that she has had 4 falls because of this  Pt states she was given lortab but states she has run out  Needs to get referral to ortho for eval; can message who the group is that takes her insurance  Bp has been running vince high, no meds in 2 years  Due for refill of her ativan and zofran as well    Review of Systems   A comprehensive review of system was obtained and negative except findings in the HPI    No data recorded     Physical Exam    [INSTRUCTIONS:  \"[x]\" Indicates a positive item  \"[]\" Indicates a negative item  -- DELETE ALL ITEMS NOT EXAMINED]    Constitutional: [x] Appears well-developed and well-nourished [x] No apparent distress      [] Abnormal -     Mental status: [x] Alert and awake  [x] Oriented to person/place/time [x] Able to follow commands    [] Abnormal -     Eyes:   EOM    [x]  Normal    [] Abnormal -   Sclera  [x]  Normal    [] Abnormal -          Discharge [x]  None visible   [] Abnormal -     HENT: [x] Normocephalic, atraumatic  [] Abnormal -   [x] Mouth/Throat: Mucous membranes are moist    External Ears [x] Normal  [] Abnormal -    Neck: [x] No visualized mass [] Abnormal -     Pulmonary/Chest: [x] Respiratory effort normal   [x] No visualized signs of difficulty breathing or respiratory distress        [] Abnormal -      Musculoskeletal:   [x] Normal gait with no signs of ataxia         [x] Normal range of motion of neck        [] Abnormal -     Neurological:        [x] No Facial Asymmetry (Cranial nerve 7 motor function) (limited exam due to video visit)          [x] No gaze palsy        [] Abnormal -          Skin:        [x] No significant exanthematous lesions or discoloration noted on facial skin         [] Abnormal -            Psychiatric:       [x] Normal Affect [] Abnormal -        [x] No Hallucinations    Other pertinent observable physical exam findings:-    On this date 01/25/2023 I have spent 20 minutes reviewing previous notes, test results and face to face (virtual) with the patient discussing the diagnosis and importance of compliance with the treatment plan as well as documenting on the day of the visit. Harper Blanco, was evaluated through a synchronous (real-time) audio-video encounter. The patient (or guardian if applicable) is aware that this is a billable service, which includes applicable co-pays. This Virtual Visit was conducted with patient's (and/or legal guardian's) consent.  The visit was conducted pursuant to the emergency declaration under the [address] [facility], [address] waiver authority and the [employer] [facility] General Act. Patient identification was verified, and a caregiver was present when appropriate. The patient was located at: Home: [address] [place] [address] [address] [facility] [id number]  The provider was located at: Home: [id number] was used to authenticate this note.   -- Mague Real NP

## 2023-02-03 ENCOUNTER — DOCUMENTATION ONLY (OUTPATIENT)
Dept: FAMILY MEDICINE CLINIC | Age: 48
End: 2023-02-03

## 2023-02-16 DIAGNOSIS — M79.7 FIBROMYALGIA: ICD-10-CM

## 2023-02-16 RX ORDER — DULOXETIN HYDROCHLORIDE 60 MG/1
CAPSULE, DELAYED RELEASE ORAL
Qty: 60 CAPSULE | Refills: 0 | Status: SHIPPED | OUTPATIENT
Start: 2023-02-16

## 2023-02-21 ENCOUNTER — OFFICE VISIT (OUTPATIENT)
Dept: FAMILY MEDICINE CLINIC | Age: 48
End: 2023-02-21
Payer: MEDICAID

## 2023-02-21 VITALS
HEIGHT: 62 IN | WEIGHT: 293 LBS | SYSTOLIC BLOOD PRESSURE: 141 MMHG | RESPIRATION RATE: 16 BRPM | DIASTOLIC BLOOD PRESSURE: 85 MMHG | HEART RATE: 85 BPM | OXYGEN SATURATION: 96 % | TEMPERATURE: 98.3 F | BODY MASS INDEX: 53.92 KG/M2

## 2023-02-21 DIAGNOSIS — M54.41 ACUTE BILATERAL LOW BACK PAIN WITH BILATERAL SCIATICA: Primary | ICD-10-CM

## 2023-02-21 DIAGNOSIS — F41.9 ANXIETY: ICD-10-CM

## 2023-02-21 DIAGNOSIS — N30.00 ACUTE CYSTITIS WITHOUT HEMATURIA: ICD-10-CM

## 2023-02-21 DIAGNOSIS — M79.7 FIBROMYALGIA: ICD-10-CM

## 2023-02-21 DIAGNOSIS — M54.42 ACUTE BILATERAL LOW BACK PAIN WITH BILATERAL SCIATICA: Primary | ICD-10-CM

## 2023-02-21 PROCEDURE — 3074F SYST BP LT 130 MM HG: CPT | Performed by: NURSE PRACTITIONER

## 2023-02-21 PROCEDURE — 99214 OFFICE O/P EST MOD 30 MIN: CPT | Performed by: NURSE PRACTITIONER

## 2023-02-21 PROCEDURE — 3078F DIAST BP <80 MM HG: CPT | Performed by: NURSE PRACTITIONER

## 2023-02-21 RX ORDER — OXYCODONE AND ACETAMINOPHEN 5; 325 MG/1; MG/1
TABLET ORAL
COMMUNITY
Start: 2023-02-17 | End: 2023-02-21 | Stop reason: ALTCHOICE

## 2023-02-21 RX ORDER — LIDOCAINE 50 MG/G
PATCH TOPICAL
COMMUNITY
Start: 2023-02-02

## 2023-02-21 RX ORDER — LORAZEPAM 1 MG/1
TABLET ORAL
Qty: 45 TABLET | Refills: 0 | Status: SHIPPED | OUTPATIENT
Start: 2023-02-21

## 2023-02-21 RX ORDER — PREDNISONE 10 MG/1
TABLET ORAL
COMMUNITY
Start: 2023-02-02 | End: 2023-02-21 | Stop reason: DRUGHIGH

## 2023-02-21 RX ORDER — CIPROFLOXACIN 500 MG/1
500 TABLET ORAL 2 TIMES DAILY
Qty: 10 TABLET | Refills: 0 | Status: SHIPPED | OUTPATIENT
Start: 2023-02-21 | End: 2023-02-26

## 2023-02-21 RX ORDER — HYDROCODONE BITARTRATE AND ACETAMINOPHEN 5; 325 MG/1; MG/1
1 TABLET ORAL
Qty: 30 TABLET | Refills: 0 | Status: SHIPPED | OUTPATIENT
Start: 2023-02-21 | End: 2023-03-23

## 2023-02-21 RX ORDER — METHOCARBAMOL 750 MG/1
TABLET, FILM COATED ORAL
COMMUNITY
Start: 2023-02-02

## 2023-02-21 NOTE — PROGRESS NOTES
Chief Complaint   Patient presents with    Follow-up     Pt being seen for fuv   -pt states that she hurt her back     Pt states she needs a Rolator to help her get around  -pt reports having falls and pt states she did hurt her leg when falling    Pt states that she has to find somewhere to go as her mom is putting her out  -pt states she has been depressed but takes Cymbalta    Pt has c/o urinary issues  -pt states she has incontinence and urgency  -pt also reports odor     1. Have you been to the ER, urgent care clinic since your last visit? Hospitalized since your last visit? Yes for chest pain    2. Have you seen or consulted any other health care providers outside of the [address] [place] [address] since your last visit? Include any pap smears or colon screening.  No    Pt has no other concerns

## 2023-02-23 ENCOUNTER — DOCUMENTATION ONLY (OUTPATIENT)
Dept: FAMILY MEDICINE CLINIC | Age: 48
End: 2023-02-23

## 2023-02-28 NOTE — PROGRESS NOTES
HISTORY OF PRESENT ILLNESS  Leia Terrell is a 49 y.o. female. HPI  Pt being seen for fuv   -pt states that she hurt her back     Pt states she needs a Rolator to help her get around  -pt reports having falls and pt states she did hurt her leg when falling    Pt states that she has to find somewhere to go as her mom is putting her out  -pt states she has been depressed but takes Cymbalta    Pt has c/o urinary issues  -pt states she has incontinence and urgency  -pt also reports odor     ROS  A comprehensive review of system was obtained and negative except findings in the HPI    Visit Vitals  BP (!) 141/85 (BP 1 Location: Left upper arm, BP Patient Position: Sitting)   Pulse 85   Temp 98.3 °F (36.8 °C) (Oral)   Resp 16   Ht 5' 2\" (1.575 m)   Wt 347 lb (157.4 kg)   LMP  (LMP Unknown)   SpO2 96%   BMI 63.47 kg/m²     Physical Exam  Vitals and nursing note reviewed. Constitutional:       Appearance: Normal appearance. She is well-developed. Comments:      Neck:      Vascular: No JVD. Cardiovascular:      Rate and Rhythm: Normal rate and regular rhythm. Heart sounds: Normal heart sounds. No murmur heard. No friction rub. No gallop. Pulmonary:      Effort: Pulmonary effort is normal. No respiratory distress. Breath sounds: Normal breath sounds. No wheezing. Skin:     General: Skin is warm. Neurological:      Mental Status: She is alert and oriented to person, place, and time. ASSESSMENT and PLAN  Encounter Diagnoses   Name Primary?     Acute bilateral low back pain with bilateral sciatica Yes    Anxiety     Acute cystitis without hematuria     Fibromyalgia      Orders Placed This Encounter    lidocaine (LIDODERM) 5 %    methocarbamoL (ROBAXIN) 750 mg tablet    LORazepam (ATIVAN) 1 mg tablet    ciprofloxacin HCl (CIPRO) 500 mg tablet    HYDROcodone-acetaminophen (NORCO) 5-325 mg per tablet     Med list updated  Given cipro for uti, refilled norco and robaxin  for back and given refill of ativan  follow up 2 weeks if not improving for further workup  I have discussed the diagnosis with the patient and the intended plan as seen in the above orders. The patient has received an after-visit summary and questions were answered concerning future plans. Patient conveyed understanding of the plan at the time of the visit.     Jennifer Rain, MSN, ANP  2/28/2023

## 2023-03-10 PROCEDURE — 99285 EMERGENCY DEPT VISIT HI MDM: CPT

## 2023-03-10 NOTE — ED TRIAGE NOTES
Triage: Pt arrives ambulatory with rollator from the [place] [address] with CC of of back pain following a GLF. Pt reports she fell backwards and hit a cement pole on her back. Denies hitting her head. Hx of chronic back pain.

## 2023-03-11 ENCOUNTER — HOSPITAL ENCOUNTER (INPATIENT)
Age: 48
LOS: 4 days | Discharge: HOME | DRG: 753 | End: 2023-03-15
Attending: EMERGENCY MEDICINE | Admitting: PSYCHIATRY & NEUROLOGY
Payer: MEDICAID

## 2023-03-11 ENCOUNTER — APPOINTMENT (OUTPATIENT)
Dept: CT IMAGING | Age: 48
DRG: 753 | End: 2023-03-11
Attending: EMERGENCY MEDICINE
Payer: MEDICAID

## 2023-03-11 DIAGNOSIS — G89.4 CHRONIC PAIN SYNDROME: ICD-10-CM

## 2023-03-11 DIAGNOSIS — R45.851 SUICIDAL IDEATION: ICD-10-CM

## 2023-03-11 DIAGNOSIS — M79.7 FIBROMYALGIA: ICD-10-CM

## 2023-03-11 DIAGNOSIS — Z59.00 HOMELESSNESS: ICD-10-CM

## 2023-03-11 DIAGNOSIS — M54.50 ACUTE LOW BACK PAIN WITHOUT SCIATICA, UNSPECIFIED BACK PAIN LATERALITY: Primary | ICD-10-CM

## 2023-03-11 PROBLEM — F32.9 MAJOR DEPRESSION: Status: ACTIVE | Noted: 2023-03-11

## 2023-03-11 LAB
ALBUMIN SERPL-MCNC: 3.4 G/DL (ref 3.5–5)
ALBUMIN/GLOB SERPL: 0.8 (ref 1.1–2.2)
ALP SERPL-CCNC: 95 U/L (ref 45–117)
ALT SERPL-CCNC: 30 U/L (ref 12–78)
AMPHET UR QL SCN: NEGATIVE
ANION GAP SERPL CALC-SCNC: 8 MMOL/L (ref 5–15)
APAP SERPL-MCNC: <2 UG/ML (ref 10–30)
APPEARANCE UR: ABNORMAL
AST SERPL-CCNC: 13 U/L (ref 15–37)
ATRIAL RATE: 100 BPM
BACTERIA URNS QL MICRO: ABNORMAL /HPF
BARBITURATES UR QL SCN: NEGATIVE
BASOPHILS # BLD: 0.1 K/UL (ref 0–0.1)
BASOPHILS NFR BLD: 1 % (ref 0–1)
BENZODIAZ UR QL: NEGATIVE
BILIRUB SERPL-MCNC: 0.3 MG/DL (ref 0.2–1)
BILIRUB UR QL: NEGATIVE
BUN SERPL-MCNC: 15 MG/DL (ref 6–20)
BUN/CREAT SERPL: 18 (ref 12–20)
CALCIUM SERPL-MCNC: 9.2 MG/DL (ref 8.5–10.1)
CALCULATED P AXIS, ECG09: 66 DEGREES
CALCULATED R AXIS, ECG10: 31 DEGREES
CALCULATED T AXIS, ECG11: -7 DEGREES
CANNABINOIDS UR QL SCN: NEGATIVE
CHLORIDE SERPL-SCNC: 100 MMOL/L (ref 97–108)
CO2 SERPL-SCNC: 30 MMOL/L (ref 21–32)
COCAINE UR QL SCN: NEGATIVE
COLOR UR: ABNORMAL
COMMENT, HOLDF: NORMAL
CREAT SERPL-MCNC: 0.84 MG/DL (ref 0.55–1.02)
DIAGNOSIS, 93000: NORMAL
DIFFERENTIAL METHOD BLD: ABNORMAL
DRUG SCRN COMMENT,DRGCM: NORMAL
EOSINOPHIL # BLD: 0.1 K/UL (ref 0–0.4)
EOSINOPHIL NFR BLD: 1 % (ref 0–7)
EPITH CASTS URNS QL MICRO: ABNORMAL /LPF
ERYTHROCYTE [DISTWIDTH] IN BLOOD BY AUTOMATED COUNT: 19.1 % (ref 11.5–14.5)
ETHANOL SERPL-MCNC: <10 MG/DL
FLUAV RNA SPEC QL NAA+PROBE: NOT DETECTED
FLUBV RNA SPEC QL NAA+PROBE: NOT DETECTED
GLOBULIN SER CALC-MCNC: 4.3 G/DL (ref 2–4)
GLUCOSE SERPL-MCNC: 170 MG/DL (ref 65–100)
GLUCOSE UR STRIP.AUTO-MCNC: NEGATIVE MG/DL
HCG UR QL: NEGATIVE
HCG UR QL: NEGATIVE
HCT VFR BLD AUTO: 42.2 % (ref 35–47)
HGB BLD-MCNC: 12.2 G/DL (ref 11.5–16)
HGB UR QL STRIP: NEGATIVE
IMM GRANULOCYTES # BLD AUTO: 0 K/UL (ref 0–0.04)
IMM GRANULOCYTES NFR BLD AUTO: 0 % (ref 0–0.5)
KETONES UR QL STRIP.AUTO: NEGATIVE MG/DL
LEUKOCYTE ESTERASE UR QL STRIP.AUTO: NEGATIVE
LYMPHOCYTES # BLD: 6.9 K/UL (ref 0.8–3.5)
LYMPHOCYTES NFR BLD: 46 % (ref 12–49)
MCH RBC QN AUTO: 22.7 PG (ref 26–34)
MCHC RBC AUTO-ENTMCNC: 28.9 G/DL (ref 30–36.5)
MCV RBC AUTO: 78.4 FL (ref 80–99)
METHADONE UR QL: NEGATIVE
MONOCYTES # BLD: 0.9 K/UL (ref 0–1)
MONOCYTES NFR BLD: 6 % (ref 5–13)
NEUTS SEG # BLD: 6.9 K/UL (ref 1.8–8)
NEUTS SEG NFR BLD: 46 % (ref 32–75)
NITRITE UR QL STRIP.AUTO: NEGATIVE
NRBC # BLD: 0 K/UL (ref 0–0.01)
NRBC BLD-RTO: 0 PER 100 WBC
OPIATES UR QL: NEGATIVE
P-R INTERVAL, ECG05: 130 MS
PCP UR QL: NEGATIVE
PH UR STRIP: 7 (ref 5–8)
PLATELET # BLD AUTO: 415 K/UL (ref 150–400)
PMV BLD AUTO: 10.4 FL (ref 8.9–12.9)
POTASSIUM SERPL-SCNC: 3.4 MMOL/L (ref 3.5–5.1)
PROT SERPL-MCNC: 7.7 G/DL (ref 6.4–8.2)
PROT UR STRIP-MCNC: NEGATIVE MG/DL
Q-T INTERVAL, ECG07: 382 MS
QRS DURATION, ECG06: 102 MS
QTC CALCULATION (BEZET), ECG08: 492 MS
RBC # BLD AUTO: 5.38 M/UL (ref 3.8–5.2)
RBC #/AREA URNS HPF: ABNORMAL /HPF (ref 0–5)
RBC MORPH BLD: ABNORMAL
SALICYLATES SERPL-MCNC: <1.7 MG/DL (ref 2.8–20)
SAMPLES BEING HELD,HOLD: NORMAL
SARS-COV-2 RNA RESP QL NAA+PROBE: NOT DETECTED
SODIUM SERPL-SCNC: 138 MMOL/L (ref 136–145)
SP GR UR REFRACTOMETRY: 1.02 (ref 1–1.03)
UA: UC IF INDICATED,UAUC: ABNORMAL
UROBILINOGEN UR QL STRIP.AUTO: 0.2 EU/DL (ref 0.2–1)
VENTRICULAR RATE, ECG03: 100 BPM
WBC # BLD AUTO: 14.9 K/UL (ref 3.6–11)
WBC MORPH BLD: ABNORMAL
WBC URNS QL MICRO: ABNORMAL /HPF (ref 0–4)

## 2023-03-11 PROCEDURE — 74011250637 HC RX REV CODE- 250/637: Performed by: STUDENT IN AN ORGANIZED HEALTH CARE EDUCATION/TRAINING PROGRAM

## 2023-03-11 PROCEDURE — 72131 CT LUMBAR SPINE W/O DYE: CPT

## 2023-03-11 PROCEDURE — 74011250636 HC RX REV CODE- 250/636: Performed by: EMERGENCY MEDICINE

## 2023-03-11 PROCEDURE — 80143 DRUG ASSAY ACETAMINOPHEN: CPT

## 2023-03-11 PROCEDURE — 74011250637 HC RX REV CODE- 250/637: Performed by: EMERGENCY MEDICINE

## 2023-03-11 PROCEDURE — 90791 PSYCH DIAGNOSTIC EVALUATION: CPT

## 2023-03-11 PROCEDURE — 81025 URINE PREGNANCY TEST: CPT

## 2023-03-11 PROCEDURE — 82077 ASSAY SPEC XCP UR&BREATH IA: CPT

## 2023-03-11 PROCEDURE — 74011250637 HC RX REV CODE- 250/637: Performed by: NURSE PRACTITIONER

## 2023-03-11 PROCEDURE — 80307 DRUG TEST PRSMV CHEM ANLYZR: CPT

## 2023-03-11 PROCEDURE — 36415 COLL VENOUS BLD VENIPUNCTURE: CPT

## 2023-03-11 PROCEDURE — 96372 THER/PROPH/DIAG INJ SC/IM: CPT

## 2023-03-11 PROCEDURE — 81001 URINALYSIS AUTO W/SCOPE: CPT

## 2023-03-11 PROCEDURE — 80179 DRUG ASSAY SALICYLATE: CPT

## 2023-03-11 PROCEDURE — 80053 COMPREHEN METABOLIC PANEL: CPT

## 2023-03-11 PROCEDURE — 65270000029 HC RM PRIVATE

## 2023-03-11 PROCEDURE — 87636 SARSCOV2 & INF A&B AMP PRB: CPT

## 2023-03-11 PROCEDURE — 85025 COMPLETE CBC W/AUTO DIFF WBC: CPT

## 2023-03-11 PROCEDURE — 93005 ELECTROCARDIOGRAM TRACING: CPT

## 2023-03-11 RX ORDER — ONDANSETRON 4 MG/1
4 TABLET, ORALLY DISINTEGRATING ORAL ONCE
Status: COMPLETED | OUTPATIENT
Start: 2023-03-11 | End: 2023-03-11

## 2023-03-11 RX ORDER — KETOROLAC TROMETHAMINE 30 MG/ML
30 INJECTION, SOLUTION INTRAMUSCULAR; INTRAVENOUS ONCE
Status: DISPENSED | OUTPATIENT
Start: 2023-03-11 | End: 2023-03-12

## 2023-03-11 RX ORDER — HYDROCODONE BITARTRATE AND ACETAMINOPHEN 5; 325 MG/1; MG/1
1 TABLET ORAL
Status: COMPLETED | OUTPATIENT
Start: 2023-03-11 | End: 2023-03-11

## 2023-03-11 RX ORDER — ACETAMINOPHEN 500 MG
1000 TABLET ORAL
Status: ACTIVE | OUTPATIENT
Start: 2023-03-11 | End: 2023-03-12

## 2023-03-11 RX ORDER — DULOXETIN HYDROCHLORIDE 60 MG/1
60 CAPSULE, DELAYED RELEASE ORAL 2 TIMES DAILY
Status: DISCONTINUED | OUTPATIENT
Start: 2023-03-11 | End: 2023-03-15 | Stop reason: HOSPADM

## 2023-03-11 RX ORDER — KETOROLAC TROMETHAMINE 30 MG/ML
30 INJECTION, SOLUTION INTRAMUSCULAR; INTRAVENOUS ONCE
Status: COMPLETED | OUTPATIENT
Start: 2023-03-11 | End: 2023-03-11

## 2023-03-11 RX ORDER — LOPERAMIDE HYDROCHLORIDE 2 MG/1
2 CAPSULE ORAL
Status: COMPLETED | OUTPATIENT
Start: 2023-03-11 | End: 2023-03-11

## 2023-03-11 RX ORDER — PEPPERMINT OIL
SPIRIT ORAL ONCE
Status: COMPLETED | OUTPATIENT
Start: 2023-03-11 | End: 2023-03-11

## 2023-03-11 RX ADMIN — DULOXETINE HYDROCHLORIDE 60 MG: 60 CAPSULE, DELAYED RELEASE ORAL at 10:50

## 2023-03-11 RX ADMIN — DULOXETINE HYDROCHLORIDE 60 MG: 60 CAPSULE, DELAYED RELEASE ORAL at 20:52

## 2023-03-11 RX ADMIN — Medication: at 16:30

## 2023-03-11 RX ADMIN — KETOROLAC TROMETHAMINE 30 MG: 30 INJECTION, SOLUTION INTRAMUSCULAR at 01:31

## 2023-03-11 RX ADMIN — HYDROCODONE BITARTRATE AND ACETAMINOPHEN 1 TABLET: 5; 325 TABLET ORAL at 11:02

## 2023-03-11 RX ADMIN — LOPERAMIDE HYDROCHLORIDE 2 MG: 2 CAPSULE ORAL at 20:20

## 2023-03-11 RX ADMIN — ONDANSETRON 4 MG: 4 TABLET, ORALLY DISINTEGRATING ORAL at 20:20

## 2023-03-11 SDOH — ECONOMIC STABILITY - HOUSING INSECURITY: HOMELESSNESS UNSPECIFIED: Z59.00

## 2023-03-11 NOTE — BSMART NOTE
BSMART complete; rec inpatient psych treatment due to SI with a plan to cut. Patient denies HI and A/V hallucinations. Suicide risk level noted to be moderate. Charge Nurse Shanae Purvis and Physician Dr. Narciso Courtney notified. Concerns not observed. Security/Off- has not been notified.

## 2023-03-11 NOTE — BH NOTES
Pt is able to ambulate with and complete ADLs (toileting, dressing) with the help of her walker and minimal staff assistance per RN.

## 2023-03-11 NOTE — CONSULTS
[address]  PSYCHIATRY CONSULT NOTE:    Name: Omar Montgomery  MR#: [id number]  : 1975  ACCOUNT#: [de-identified]  ADMIT DATE: 3/11/2023    REASON FOR CONSULT:  david    HISTORY OF PRESENTING COMPLAINT:  Omar Montgomery is a 49 y.o. OTHER female who is currently admitted to the ED at [address] initially presented to the ED with CC of back pain after falling while using her rollator at [employer] [facility], but has since made threats of jumping into traffic or cutting herself if she is discharged, as she is newly homeless. She reported to [facility] that she has been suicidal on and off since January related to pain. She endorses SI with plan to cut herself. No HI/plan/intent, no AVH. PAST PSYCHIATRIC HISTORY: Pt reports a hx of depression, anxiety, and PTSD. Denies a hx of past suicide attempts. Reports hx of past psychiatric admissions, once at [facility] earlier this year, twice at [facility]. SUBSTANCE ABUSE HISTORY: Denies substance abuse. UDS negative. PSYCHOSOCIAL HISTORY: Pt is newly homeless. She has been staying with family until recently, and has nowhere to stay currently. She has one 27year old son who is not in her life. She has a weak support system. PAST MEDICAL HISTORY:  Past Medical History:   Diagnosis Date    Asthma     Chronic pain     Depression     Fibromyalgia     Hypertension      Prior to Admission medications    Medication Sig Start Date End Date Taking? Authorizing Provider   lidocaine (LIDODERM) 5 % apply 1 patch TO THE AFFECTED AREA DAILY. LEAVE ON FOR 12 HOURS AND THEN OFF FOR 12 HOURS. 23   Provider, Historical   methocarbamoL (ROBAXIN) 750 mg tablet take 2 tablet by mouth three times a day for muscle spasm 23   Provider, Historical   LORazepam (ATIVAN) 1 mg tablet TAKE 1 TABLET BY MOUTH TWICE DAILY AS NEEDED FOR ANXIETY AND SLEEP.  DAILY AMOUNT 2 MG MUST LAST 30 DAYS 23   Ezequiel Olivares NP   HYDROcodone-acetaminophen [facility]) 5-325 mg per tablet Take 1 Tablet by mouth three (3) times daily as needed for Pain for up to 30 days. Max Daily Amount: 3 Tablets. 2/21/23 3/23/23  Kaur Triplett NP   DULoxetine (CYMBALTA) 60 mg capsule take 1 capsule by mouth twice a day 2/16/23   Kaur Triplett NP   ondansetron (ZOFRAN ODT) 4 mg disintegrating tablet DISSOLVE 1 TABLET IN MOUTH EVERY 8 HOURS AS NEEDED 1/25/23   Kaur Triplett NP   valsartan-hydroCHLOROthiazide (DIOVAN-HCT) 160-12.5 mg per tablet Take 1 Tablet by mouth daily. 1/25/23   Kaur Triplett NP   albuterol (Proventil HFA) 90 mcg/actuation inhaler INHALE TWO PUFFS BY MOUTH EVERY 4 HOURS AS NEEDED FOR WHEEZING FOR  UP  TO  360  DAYS 11/28/22   Kaur Triplett NP       Visit Vitals  BP (!) 163/76 (BP 1 Location: Right arm, BP Patient Position: At rest)   Pulse 82   Temp 98.6 °F (37 °C)   Resp 20   Ht 5' 2\" (1.575 m)   Wt (!) 164.8 kg (363 lb 5.1 oz)   SpO2 99%   BMI 66.45 kg/m²       No current facility-administered medications for this encounter. Current Outpatient Medications:     lidocaine (LIDODERM) 5 %, apply 1 patch TO THE AFFECTED AREA DAILY. LEAVE ON FOR 12 HOURS AND THEN OFF FOR 12 HOURS., Disp: , Rfl:     methocarbamoL (ROBAXIN) 750 mg tablet, take 2 tablet by mouth three times a day for muscle spasm, Disp: , Rfl:     LORazepam (ATIVAN) 1 mg tablet, TAKE 1 TABLET BY MOUTH TWICE DAILY AS NEEDED FOR ANXIETY AND SLEEP. DAILY AMOUNT 2 MG MUST LAST 30 DAYS, Disp: 45 Tablet, Rfl: 0    HYDROcodone-acetaminophen (NORCO) 5-325 mg per tablet, Take 1 Tablet by mouth three (3) times daily as needed for Pain for up to 30 days. Max Daily Amount: 3 Tablets. , Disp: 30 Tablet, Rfl: 0    DULoxetine (CYMBALTA) 60 mg capsule, take 1 capsule by mouth twice a day, Disp: 60 Capsule, Rfl: 0    ondansetron (ZOFRAN ODT) 4 mg disintegrating tablet, DISSOLVE 1 TABLET IN MOUTH EVERY 8 HOURS AS NEEDED, Disp: 15 Tablet, Rfl: 0    valsartan-hydroCHLOROthiazide (DIOVAN-HCT) 160-12.5 mg per tablet, Take 1 Tablet by mouth daily. , Disp: 90 Tablet, Rfl: 1    albuterol (Proventil HFA) 90 mcg/actuation inhaler, INHALE TWO PUFFS BY MOUTH EVERY 4 HOURS AS NEEDED FOR WHEEZING FOR  UP  TO  360  DAYS, Disp: 1 Each, Rfl: 3    MENTAL STATUS EXAM: The patient is a 49 y.o. obese female who is in poor grooming, lying on a hospital bed. The patient was irritable and dysphoric. She endorsed thoughts of suicide with plan to cut self or walk into traffic. She denied thoughts of harming others. There was no agitation or lability noted. Speech was of normal rate, volume, and rhythm. There was no evidence of any dave or hypomania or any symptoms of psychosis, such as hallucinations, delusional thinking, etc. Thought process was linear and organized. Cognitively, the patient showed no signs of any deficits in memory, word-finding, processing speed, or executive functioning. Judgment and insight are noted to be poor. DIAGNOSTIC IMPRESSION: Mood disorder NOS, PTSD by hx, r/o malingering    RECOMMENDATIONS: This is a situation where pt would likely benefit from more resources in terms of housing and outpatient support rather than an acute need for inpatient psychiatry due to high suspicion of malingering r/t recent homelessness. However, due to threats of SI, we will accept her to the [facility] pending bed availability with plan to admit to a CSU on Monday. I will restart Cymbalta 60mg BID as pt reports taking consistently. Thank you for the opportunity to participate in the care of your patient. Please re-consult the psychiatry service as needed.

## 2023-03-11 NOTE — ED PROVIDER NOTES
79-year-old female history of asthma, chronic pain, depression, fibromyalgia, hypertension presents to the emergency department chief plaint of fall. She tells me she slipped while using her rollator at [employer] falling onto her back. She complains of difficulty ambulating. She was observed by nursing ambulating with her rollator. She apparently has been staying with family, although had to leave the house today and has nowhere to stay tonight. Newly homeless. The history is provided by the patient and medical records. Back Pain   This is a chronic problem. Past Medical History:   Diagnosis Date    Asthma     Chronic pain     Depression     Fibromyalgia     Hypertension        Past Surgical History:   Procedure Laterality Date    HX CHOLECYSTECTOMY      HX GYN           Family History:   Problem Relation Age of Onset    Elevated Lipids Mother     Arthritis-rheumatoid Mother        Social History     Socioeconomic History    Marital status:      Spouse name: Not on file    Number of children: Not on file    Years of education: Not on file    Highest education level: Not on file   Occupational History    Not on file   Tobacco Use    Smoking status: Never    Smokeless tobacco: Never   Vaping Use    Vaping Use: Never used   Substance and Sexual Activity    Alcohol use: No    Drug use: No    Sexual activity: Yes   Other Topics Concern    Not on file   Social History Narrative    Not on file     Social Determinants of Health     Financial Resource Strain: Not on file   Food Insecurity: Not on file   Transportation Needs: Not on file   Physical Activity: Not on file   Stress: Not on file   Social Connections: Not on file   Intimate Partner Violence: Not on file   Housing Stability: Not on file         ALLERGIES: Patient has no known allergies. Review of Systems   Musculoskeletal:  Positive for back pain.      Vitals:    03/10/23 1852   BP: (!) 177/98   Pulse: 96   Resp: 16   Temp: 98.6 °F (37 °C) SpO2: 99%            Physical Exam  Vitals and nursing note reviewed. Constitutional:       General: She is not in acute distress. Appearance: She is obese. She is not ill-appearing. Pulmonary:      Effort: Pulmonary effort is normal. No respiratory distress. Musculoskeletal:         General: Tenderness (Generalized lower back tenderness including midline and bilateral paraspinal muscles without deformity or step-off) present. Comments: EHL intact bilaterally   Neurological:      General: No focal deficit present. Mental Status: She is alert. Medical Decision Making  79-year-old female presents above after fall. She has chronic back pain as well and her report of Lortab treatment by her primary care is corroborated by  aware. She has no evidence of acute fracture or other pathology which would require hospitalization. She has already been prescribed NSAIDs, muscle relaxer, Lidoderm. At the time of anticipated discharge patient complains that she has no vertigo, will go throw herself into traffic if she is discharged. BSMART was consulted. They feel that her impulsivity makes her high risk and she warrants psychiatric admission. Amount and/or Complexity of Data Reviewed  Labs: ordered. Radiology: ordered and independent interpretation performed. Decision-making details documented in ED Course. ECG/medicine tests: ordered. Risk  Prescription drug management. ED Course as of 03/11/23 0253   Sat Mar 11, 2023   0127 I have independently viewed the obtained radiographic images and note CT lumbar spine without acute findings. Will await radiology read.  [JM]   7974 Patient has been evaluated by [facility], will likely seek psychiatric admission [JM]      ED Course User Index  [BRISA] Fortunato Barraza MD       Procedures
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
## 2023-03-11 NOTE — ED PROVIDER NOTES
7:20 AM  Change of shift. Care of patient taken over from Jamarcus Mckeon MD; H&P reviewed, bedside handoff complete. Awaiting medical clearance, bed placement. 9:22 AM  Patient has been medically cleared.
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
## 2023-03-11 NOTE — ED NOTES
Pt informed sitter that she defecated on herself. ED staff rolled stretcher to restroom and gave patient supplies to clean up and change. Pt ambulated from stretcher to restroom without difficulty. She also changed herself without any difficulty.

## 2023-03-11 NOTE — ED NOTES
Pt previously stated to PA that she was unable to ambulate. This RN visualized pt ambulating in waiting room with her rollator and without assistance or difficulty. Pt ambulated approximately 75ft and tolerated well.

## 2023-03-11 NOTE — ED NOTES
When informing pt that she will be discharged- she stated \"welll who is discharging me? I was asked 2 questions and was not even given a proper exam.  So if you are going to discharge me then I will go in to the street and jump into traffic and kill myself\". Pt then stated \"I was kicked out of my cousins home earlier and took a left and went to [employer]- and that's when I fell. \"   When asking pt if she is feeling this way now because she's being discharged- she states \" no. I've been feeling azalia this a long time\".  Dr Adi Patricio updated on this info

## 2023-03-11 NOTE — DISCHARGE INSTRUCTIONS
DISCHARGE SUMMARY    NAME:Iliana Cotto  : 1975  MRN: [id number]    The patient Faustino Nolasco exhibits the ability to control behavior in a less restrictive environment. Patient's level of functioning is improving. No assaultive/destructive behavior has been observed for the past 24 hours. No suicidal/homicidal threat or behavior has been observed for the past 24 hours. There is no evidence of serious medication side effects. Patient has not been in physical or protective restraints for at least the past 24 hours. If weapons involved, how are they secured? None    Is patient aware of and in agreement with discharge plan? Yes    Arrangements for medication:  Prescriptions sent to [facility] pharmacy    Copy of discharge instructions to provider?: Yes    Arrangements for transportation home: CSU    Keep all follow up appointments as scheduled, continue to take prescribed medications per physician instructions. Mental health crisis number:  356 or your local mental health crisis line number at [facility] at [address] [facility] [address] Emergency WARM LINE      5-812-740-MHAV ([phone])      M-F: 9am to 9pm      Sat & Sun: 5pm - 9pm  National suicide prevention lines:                             1-441-QAHWAVB (7-627-872-133-395-1908)       7-485-376-TALK (1-629-069-735-227-9588)    Crisis Text Line:  Text HOME to 548424     Thank you for allowing us to provide you with medical care today. We realize that you have many choices for your emergency care needs. We thank you for choosing [facility]. Please choose us in the future for any continued health care needs. We hope we addressed all of your medical concerns. We strive to provide excellent quality care in the Emergency Department. Anything less than excellent does not meet our expectations. The exam and treatment you received in the Emergency Department were for an emergent problem and are not intended as complete care.  It is important that you follow up with a doctor, nurse practitioner, or [de-identified] assistant for ongoing care. If your symptoms worsen or you do not improve as expected and you are unable to reach your usual health care provider, you should return to the Emergency Department. We are available 24 hours a day. Take this sheet with you when you go to your follow-up visit. If you have any problem arranging the follow-up visit, contact the Emergency Department immediately. Make an appointment your family doctor for follow up of this visit. Return to the ER if you are unable to be seen in a timely manner. DISCHARGE SUMMARY from Nurse    PATIENT INSTRUCTIONS:      What to do at Home:  Recommended activity: Activity as tolerated,       *  Please give a list of your current medications to your Primary Care Provider. *  Please update this list whenever your medications are discontinued, doses are      changed, or new medications (including over-the-counter products) are added. *  Please carry medication information at all times in case of emergency situations. These are general instructions for a healthy lifestyle:    No smoking/ No tobacco products/ Avoid exposure to second hand smoke  Surgeon General's Warning:  Quitting smoking now greatly reduces serious risk to your health. Obesity, smoking, and sedentary lifestyle greatly increases your risk for illness    A healthy diet, regular physical exercise & weight monitoring are important for maintaining a healthy lifestyle    You may be retaining fluid if you have a history of heart failure or if you experience any of the following symptoms:  Weight gain of 3 pounds or more overnight or 5 pounds in a week, increased swelling in our hands or feet or shortness of breath while lying flat in bed. Please call your doctor as soon as you notice any of these symptoms; do not wait until your next office visit.         The discharge information has been reviewed with the patient. The patient verbalized understanding. Discharge medications reviewed with the patient and appropriate educational materials and side effects teaching were provided.   ___________________________________________________________________________________________________________________________________

## 2023-03-11 NOTE — BSMART NOTE
ADULT VOLUNTARY BED SEARCH STARTED/RESUMED AT 3/11/2023:    Pt expressed she is not willing to go too far out of the [place] area at this time    [facility] ([place], [address], [address], [address], [address]):   [facility]: Needs Leonel bed due to incontinence, uses rolader, recent fall, needs shower chair and help in shower but no Leonel beds available  [address]: declined due to needs  [place]: Exceeds Weight Limit  [facility]: Exceeds Weight Limit  [place]: Declined due to needs    [facility]: contacted at 11:02 spoke with Bismark Trejo reported fax clinicals; [phone] update, no appropriate bed at this time    [facility] (Layman Hammans [address]  [facility], [address] [facility], [facility], [place], [address] [facility]): contacted at 11:28 reported at [address] [facility]: contacted at 11:30 spoke with Chelsie reported fax clinicals; [phone] update: paperwork received and still being reviewed    [facility]: contacted at [phone] spoke with Avani Moreno reported at capacity for female beds     [facility]: contacted at 12:05 spoke with (left voice mail), clinicals faxed     [address]: contacted at [phone] spoke with [place] reported fax clinicals, update [phone]: paperwork has not been reviewed yet but will call upon review     [address]: contacted at  spoke with Rell Morris reported fax clinicals, 1326 update: unable to accept to due needs and support with ambulating (too high acuity at this time)    [facility] @[facility]: contacted at  spoke with reported     JOSE GUADALUPE Maki

## 2023-03-12 LAB — TSH SERPL DL<=0.05 MIU/L-ACNC: 2.83 UIU/ML (ref 0.36–3.74)

## 2023-03-12 PROCEDURE — 84443 ASSAY THYROID STIM HORMONE: CPT

## 2023-03-12 PROCEDURE — 74011250636 HC RX REV CODE- 250/636: Performed by: NURSE PRACTITIONER

## 2023-03-12 PROCEDURE — 65220000003 HC RM SEMIPRIVATE PSYCH

## 2023-03-12 PROCEDURE — 74011250637 HC RX REV CODE- 250/637: Performed by: NURSE PRACTITIONER

## 2023-03-12 RX ORDER — HYDROXYZINE 50 MG/1
50 TABLET, FILM COATED ORAL
Status: DISCONTINUED | OUTPATIENT
Start: 2023-03-12 | End: 2023-03-15 | Stop reason: HOSPADM

## 2023-03-12 RX ORDER — OLANZAPINE 5 MG/1
5 TABLET ORAL
Status: DISCONTINUED | OUTPATIENT
Start: 2023-03-12 | End: 2023-03-15 | Stop reason: HOSPADM

## 2023-03-12 RX ORDER — LORAZEPAM 2 MG/ML
1 INJECTION, SOLUTION INTRAMUSCULAR; INTRAVENOUS
Status: DISCONTINUED | OUTPATIENT
Start: 2023-03-12 | End: 2023-03-15 | Stop reason: HOSPADM

## 2023-03-12 RX ORDER — HALOPERIDOL 5 MG/ML
5 INJECTION INTRAMUSCULAR
Status: DISCONTINUED | OUTPATIENT
Start: 2023-03-12 | End: 2023-03-15 | Stop reason: HOSPADM

## 2023-03-12 RX ORDER — LORAZEPAM 0.5 MG/1
0.5 TABLET ORAL
Status: COMPLETED | OUTPATIENT
Start: 2023-03-12 | End: 2023-03-12

## 2023-03-12 RX ORDER — HYDROCODONE BITARTRATE AND ACETAMINOPHEN 5; 325 MG/1; MG/1
1 TABLET ORAL ONCE
Status: COMPLETED | OUTPATIENT
Start: 2023-03-12 | End: 2023-03-12

## 2023-03-12 RX ORDER — LOPERAMIDE HYDROCHLORIDE 2 MG/1
2 CAPSULE ORAL
Status: DISCONTINUED | OUTPATIENT
Start: 2023-03-12 | End: 2023-03-15 | Stop reason: HOSPADM

## 2023-03-12 RX ORDER — DIPHENHYDRAMINE HYDROCHLORIDE 50 MG/ML
50 INJECTION, SOLUTION INTRAMUSCULAR; INTRAVENOUS
Status: DISCONTINUED | OUTPATIENT
Start: 2023-03-12 | End: 2023-03-15 | Stop reason: HOSPADM

## 2023-03-12 RX ORDER — ACETAMINOPHEN 325 MG/1
650 TABLET ORAL
Status: DISCONTINUED | OUTPATIENT
Start: 2023-03-12 | End: 2023-03-15 | Stop reason: HOSPADM

## 2023-03-12 RX ORDER — TRAZODONE HYDROCHLORIDE 50 MG/1
50 TABLET ORAL
Status: DISCONTINUED | OUTPATIENT
Start: 2023-03-12 | End: 2023-03-15 | Stop reason: HOSPADM

## 2023-03-12 RX ORDER — ONDANSETRON 4 MG/1
4 TABLET, ORALLY DISINTEGRATING ORAL
Status: DISCONTINUED | OUTPATIENT
Start: 2023-03-12 | End: 2023-03-15 | Stop reason: HOSPADM

## 2023-03-12 RX ORDER — BENZTROPINE MESYLATE 1 MG/1
1 TABLET ORAL
Status: DISCONTINUED | OUTPATIENT
Start: 2023-03-12 | End: 2023-03-15 | Stop reason: HOSPADM

## 2023-03-12 RX ORDER — ADHESIVE BANDAGE
30 BANDAGE TOPICAL DAILY PRN
Status: DISCONTINUED | OUTPATIENT
Start: 2023-03-12 | End: 2023-03-15 | Stop reason: HOSPADM

## 2023-03-12 RX ADMIN — ACETAMINOPHEN 650 MG: 325 TABLET ORAL at 17:08

## 2023-03-12 RX ADMIN — LOPERAMIDE HYDROCHLORIDE 2 MG: 2 CAPSULE ORAL at 17:07

## 2023-03-12 RX ADMIN — TRAZODONE HYDROCHLORIDE 50 MG: 50 TABLET ORAL at 20:52

## 2023-03-12 RX ADMIN — DULOXETINE HYDROCHLORIDE 60 MG: 60 CAPSULE, DELAYED RELEASE ORAL at 10:03

## 2023-03-12 RX ADMIN — DULOXETINE HYDROCHLORIDE 60 MG: 60 CAPSULE, DELAYED RELEASE ORAL at 20:53

## 2023-03-12 RX ADMIN — ONDANSETRON 4 MG: 4 TABLET, ORALLY DISINTEGRATING ORAL at 17:08

## 2023-03-12 RX ADMIN — HYDROCODONE BITARTRATE AND ACETAMINOPHEN 1 TABLET: 5; 325 TABLET ORAL at 18:29

## 2023-03-12 RX ADMIN — LORAZEPAM 0.5 MG: 0.5 TABLET ORAL at 20:52

## 2023-03-12 NOTE — ED NOTES
TRANSFER - OUT REPORT:    Verbal report given to Guanaco RN(name) on Curtistine Fam  being transferred to 742(unit) for routine progression of care       Report consisted of patients Situation, Background, Assessment and   Recommendations(SBAR). Information from the following report(s) SBAR, Kardex, ED Summary, MAR, Recent Results, and Quality Measures was reviewed with the receiving nurse. Lines:       Opportunity for questions and clarification was provided.       Patient transported with:   Uanbai

## 2023-03-12 NOTE — PROGRESS NOTES
Problem: Depressed Mood (Adult/Pediatric)  Goal: *STG: Participates in treatment plan  Outcome: Progressing Towards Goal  Goal: *STG: Verbalizes anger, guilt, and other feelings in a constructive manor  Outcome: Progressing Towards Goal  Goal: *STG: Remains safe in hospital  Outcome: Progressing Towards Goal  Goal: *STG: Complies with medication therapy  Outcome: Progressing Towards Goal  Goal: Interventions  Outcome: Progressing Towards Goal     Problem: Falls - Risk of  Goal: *Absence of Falls  Description: Document Rajat Fall Risk and appropriate interventions in the flowsheet. Outcome: Progressing Towards Goal  Note: Fall Risk Interventions:     Pt isolative to self. Denies current SI/HI/AVH. Remains medication and meal compliant. Will continue to monitor q15 minutes for safety.

## 2023-03-12 NOTE — H&P
[facility]  INITIAL PSYCHIATRIC INTERVIEW:    Name: Andre Hernandez  MR#: [id number]  ACCOUNT#: [de-identified]  : 1975  ADMIT DATE: 3/11/2023    CHIEF COMPLAINT: \"I'm not good. \"     HISTORY OF PRESENTING COMPLAINT:  This is a 49 y.o. female who is currently admitted to the psychiatric floor at [facility] on a voluntary basis for suicidal thoughts with plan to cut self or jump into traffic. Pt became homeless two days ago after living with her cousins, but she was asked to leave their place. She reports experiencing depressive symptoms and suicidal ideation on and off since January, largely triggered by pain as well as fears about homelessness and not being able to care for herself. Currently, she endorses SI, but no plan/intent at this time. Denies HI/plan/intent, denies AVH. PAST PSYCHIATRIC HISTORY: Pt reports a hx of depression, anxiety, and PTSD. Denies a hx of past suicide attempts. Reports hx of past psychiatric admissions, once at [facility] earlier this year, twice at [facility]. SUBSTANCE ABUSE HISTORY: Denies substance abuse. UDS negative. PSYCHOSOCIAL HISTORY: Pt is newly homeless. She has been staying with family until recently, and has nowhere to stay currently. She has one 27year old son who is not in her life. She has a weak support system. PAST MEDICAL HISTORY: Reviewed per H&P. ALLERGIES: NKDA    MENTAL STATUS EXAM: The patient is a 49 y.o. obese female who is in poor grooming, lying on a hospital bed. The patient was  dysphoric. She endorsed thoughts of suicide without plan. She denied thoughts of harming others. There was no agitation or lability noted. Speech was of normal rate, volume, and rhythm. There was no evidence of any dave or hypomania or any symptoms of psychosis, such as hallucinations, delusional thinking, etc. Thought process was linear and organized.  Cognitively, the patient showed no signs of any deficits in memory, word-finding, processing speed, or executive functioning. Judgment and insight are noted to be poor. DIAGNOSTIC IMPRESSION: Mood disorder NOS, PTSD by hx, r/o malingering    PLAN:   Continue inpatient stay for the safety and optimum care of the patient. Routine labs to be ordered as needed. Psychotropic medications will be ordered and adjusted as needed.  checked, pt last filled Lortab 5-325 2/21/23 , #30 for 30 days. At this time I will order a one time dose as we discuss alternate pain management options in treatment team. Ativan 1mg filled 2/25/23, #45 for 30 days. Pt has not received any Ativan in the ED, so none since the last 2 days. Will order a one time dose of 0.5mg to prevent withdrawal, though pt has not exhibited any signs/reported symptoms of benzodiazepine withdrawal.   Supportive, milieu and group therapy. Estimated length of stay is 5-7 days, dependent upon pt's participation in treatment, response to pharmacological and non-pharmacological interventions, and follow-up plan including outpatient providers and safe environment for discharge. I certify that this patient's inpatient psychiatric hospital admission is medically necessary for treatment which could reasonably be expected to improve this patient's condition. The inpatient psychiatric services are provided while the individual is under the care of a psychiatric provider and are included in the individualized plan of care.

## 2023-03-12 NOTE — BH NOTES
TRANSFER - IN REPORT:    Verbal report received from ISHMAEL Deras on Ruperto Swan  being received from ER for routine progression of care      Report consisted of patients Situation, Background, Assessment and   Recommendations(SBAR). Information from the following report(s) SBAR was reviewed with the receiving nurse. Opportunity for questions and clarification was provided. Assessment completed upon patients arrival to unit and care assumed.
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
## 2023-03-12 NOTE — BSMART NOTE
Pt accepted to [facility] [place] [facility] room 742/2 admitted by Agustín Henley NP followed by Dr. Rosita Edouard. Plan to admit tomorrow morning after 0700 due to staffing.

## 2023-03-13 LAB
CHOLEST SERPL-MCNC: 150 MG/DL
GLUCOSE P FAST SERPL-MCNC: 116 MG/DL (ref 65–100)
HDLC SERPL-MCNC: 43 MG/DL
HDLC SERPL: 3.5 (ref 0–5)
LDLC SERPL CALC-MCNC: 80.4 MG/DL (ref 0–100)
TRIGL SERPL-MCNC: 133 MG/DL (ref ?–150)
VLDLC SERPL CALC-MCNC: 26.6 MG/DL

## 2023-03-13 PROCEDURE — 74011000250 HC RX REV CODE- 250: Performed by: NURSE PRACTITIONER

## 2023-03-13 PROCEDURE — 97116 GAIT TRAINING THERAPY: CPT

## 2023-03-13 PROCEDURE — 82947 ASSAY GLUCOSE BLOOD QUANT: CPT

## 2023-03-13 PROCEDURE — 65220000003 HC RM SEMIPRIVATE PSYCH

## 2023-03-13 PROCEDURE — 36415 COLL VENOUS BLD VENIPUNCTURE: CPT

## 2023-03-13 PROCEDURE — 97161 PT EVAL LOW COMPLEX 20 MIN: CPT

## 2023-03-13 PROCEDURE — 80061 LIPID PANEL: CPT

## 2023-03-13 PROCEDURE — 74011250637 HC RX REV CODE- 250/637: Performed by: NURSE PRACTITIONER

## 2023-03-13 RX ORDER — LIDOCAINE 4 G/100G
2 PATCH TOPICAL EVERY 24 HOURS
Status: DISCONTINUED | OUTPATIENT
Start: 2023-03-13 | End: 2023-03-15 | Stop reason: HOSPADM

## 2023-03-13 RX ORDER — GABAPENTIN 300 MG/1
300 CAPSULE ORAL 3 TIMES DAILY
Status: DISCONTINUED | OUTPATIENT
Start: 2023-03-13 | End: 2023-03-15 | Stop reason: HOSPADM

## 2023-03-13 RX ADMIN — ACETAMINOPHEN 650 MG: 325 TABLET ORAL at 09:15

## 2023-03-13 RX ADMIN — DULOXETINE HYDROCHLORIDE 60 MG: 60 CAPSULE, DELAYED RELEASE ORAL at 21:21

## 2023-03-13 RX ADMIN — TRAZODONE HYDROCHLORIDE 50 MG: 50 TABLET ORAL at 21:21

## 2023-03-13 RX ADMIN — DULOXETINE HYDROCHLORIDE 60 MG: 60 CAPSULE, DELAYED RELEASE ORAL at 09:15

## 2023-03-13 RX ADMIN — GABAPENTIN 300 MG: 300 CAPSULE ORAL at 21:21

## 2023-03-13 RX ADMIN — ACETAMINOPHEN 650 MG: 325 TABLET ORAL at 16:11

## 2023-03-13 RX ADMIN — GABAPENTIN 300 MG: 300 CAPSULE ORAL at 16:11

## 2023-03-13 RX ADMIN — ACETAMINOPHEN 650 MG: 325 TABLET ORAL at 21:21

## 2023-03-13 NOTE — PROGRESS NOTES
PHYSICAL THERAPY EVALUATION/DISCHARGE  Patient: Joanna Pritchett (71 y.o. female)  Date: 3/13/2023  Primary Diagnosis: Major depression [F32.9]       Precautions: Fall        ASSESSMENT  Based on the objective data described below, the patient presents with overall mobility at/near baseline function s/p admission for depression. Per chart review, pt is recently homeless - was living with a cousin but that is no longer an option and she has no support. Per pt, she was mobilizing mod I with a rollator, however the rollator does not belong to her and she no longer has access to it. She does have a RW which was present during session. Pt reports chronic back pain which was exacerbated by an injury in January 2023 and she has had 5 falls since then when her legs give out. She said none of the falls occurred with the rollator. This date, pt was mod I transferring supine<>sit, sit<>stand, and ambulating to/from bathroom with RW and was able to navigate in the bathroom without an AD without difficulty. Per RN, pt has been mobilizing with a RW without difficulty (although pt inconsistently reports inability to ambulate). May benefit from a rollator if able (will place order to case management), however was able to mobilize with RW safely. Pt is at her baseline and has no acute care PT needs. Will sign off. Functional Outcome Measure: The patient scored 85/100 on the Barthel outcome measure. Other factors to consider for discharge: chronic back pain, homeless, hx falls     Further skilled acute physical therapy is not indicated at this time. PLAN :  Recommendation for discharge: (in order for the patient to meet his/her long term goals)  No skilled physical therapy/ follow up rehabilitation needs identified at this time.     This discharge recommendation:  Has not yet been discussed the attending provider and/or case management    IF patient discharges home will need the following DME: rollator (if unable, pt has a RW and is safe mobilizing with it)       SUBJECTIVE:   Patient stated My legs never gave out when I was using the rollator.     OBJECTIVE DATA SUMMARY:   HISTORY:    Past Medical History:   Diagnosis Date    Asthma     Chronic pain     Depression     Fibromyalgia     Hypertension      Past Surgical History:   Procedure Laterality Date    HX CHOLECYSTECTOMY      HX GYN         Prior level of function: Per chart review, pt is recently homeless - was living with a cousin but that is no longer an option and she has no support. Per pt, she was mobilizing mod I with a rollator, however the rollator does not belong to her and she no longer has access to it. She does have a RW which was present during session. Pt reports chronic back pain which was exacerbated by an injury in January 2023 and she has had 5 falls since then when her legs give out. Home Situation  Home Environment: Other (comment)  # Steps to Enter: 0  One/Two Story Residence: Other (Comment)  # of Interior Steps: 0  Height of Each Step (in): 0 inches  Interior Rails: None  Lift Chair Available: No  Living Alone: Yes  Support Systems: No Support Systems  Patient Expects to be Discharged to[de-identified] Homeless  Current DME Used/Available at Home: None    EXAMINATION/PRESENTATION/DECISION MAKING:   Critical Behavior:  Neurologic State: Alert  Orientation Level: Oriented X4        Hearing: Auditory  Auditory Impairment: None    Range Of Motion:  AROM: Within functional limits                       Strength:    Strength: Within functional limits                    Tone & Sensation:   Tone: Normal                              Coordination:  Coordination: Within functional limits    Functional Mobility:  Bed Mobility:     Supine to Sit: Modified independent; Additional time  Sit to Supine: Modified independent; Additional time     Transfers:  Sit to Stand: Modified independent  Stand to Sit: Modified independent                       Balance: Sitting: Intact  Standing: Intact; With support (RW)  Ambulation/Gait Training:  Distance (ft): 20 Feet (ft)  Assistive Device: Walker, rolling  Ambulation - Level of Assistance: Modified independent        Gait Abnormalities: Decreased step clearance;Shuffling gait;Trunk sway increased        Base of Support: Widened     Speed/Starla: Slow;Shuffled  Step Length: Right shortened;Left shortened    Functional Measure:  Barthel Index:    Bathin  Bladder: 10  Bowels: 10  Groomin  Dressing: 10  Feeding: 10  Mobility: 15  Stairs: 0  Toilet Use: 10  Transfer (Bed to Chair and Back): 15  Total: 85/100       The Barthel ADL Index: Guidelines  1. The index should be used as a record of what a patient does, not as a record of what a patient could do. 2. The main aim is to establish degree of independence from any help, physical or verbal, however minor and for whatever reason. 3. The need for supervision renders the patient not independent. 4. A patient's performance should be established using the best available evidence. Asking the patient, friends/relatives and nurses are the usual sources, but direct observation and common sense are also important. However direct testing is not needed. 5. Usually the patient's performance over the preceding 24-48 hours is important, but occasionally longer periods will be relevant. 6. Middle categories imply that the patient supplies over 50 per cent of the effort. 7. Use of aids to be independent is allowed. Score Interpretation (from [address] [address])    Independent   60-79 Minimally independent   40-59 Partially dependent   20-39 Very dependent   <20 Totally dependent     -Joe Oreilly., Barthel, D.W. (1965). Functional evaluation: the Barthel Index. [facility] ([address] (1997). The Barthel activities of daily living index: self-reporting versus actual performance in the old (> or = 75 years).  Journal of [address] [place] [address] 45(7), 14 [facility] Memorial HospitalDouglasDouglasJIM, Niya Tee., Natalia Slade. (). Measuring the change in disability after inpatient rehabilitation; comparison of the responsiveness of the Barthel Index and Functional [place] Measure. Journal of Neurology, Neurosurgery, and Psychiatry, 66(4), 165-338. MARKIE Singer, PABLO Grossman, & Mattie Echols M.A. (2004) Assessment of post-stroke quality of life in cost-effectiveness studies: The usefulness of the Barthel Index and the EuroQoL-5D. Quality of Life Research, 13, 427-43        Pain Ratin/10 back pain after receiving medication    Activity Tolerance:   Good      After treatment patient left in no apparent distress:   Supine in bed    COMMUNICATION/EDUCATION:   The patients plan of care was discussed with: Registered nurse. Fall prevention education was provided and the patient/caregiver indicated understanding., Patient/family have participated as able in goal setting and plan of care. , and Patient/family agree to work toward stated goals and plan of care.     Thank you for this referral.  Lori Tello, PT, DPT   Time Calculation: 16 mins

## 2023-03-13 NOTE — PROGRESS NOTES
Problem: Depressed Mood (Adult/Pediatric)  Goal: *STG: Participates in treatment plan  Outcome: Progressing Towards Goal  Goal: *STG: Remains safe in hospital  Outcome: Progressing Towards Goal     Problem: Patient Education: Go to Patient Education Activity  Goal: Patient/Family Education  Outcome: Progressing Towards Goal
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
## 2023-03-13 NOTE — BH NOTES
PSYCHIATRIC PROGRESS NOTE    Patient: Sher Guadalupe MRN: [id number]  SSN: [id number]    YOB: 1975  Age: 49 y.o. Sex: female      Admit Date: 3/11/2023    Length of Stay: 2 Days    Chief Complaint: \"I'm not good. \"     Interval History:   3/13/23- Brenna states she is not doing well. She is still having pain. She states mood is low because of her pain and also because of worries about homelessness. Pt denies SI/plan/intent, denies HI/plan/intent, denies AVH, no evidence of any psychotic processes observed. No complaints reported with eating or sleeping. Sher Guadalupe has been compliant with medications and finds them beneficial. Denies adverse effects. Denies other changes or new concerns or questions at this time. Past Medical History:  Past Medical History:   Diagnosis Date    Asthma     Chronic pain     Depression     Fibromyalgia     Hypertension        Labs:  Lab Results   Component Value Date/Time    WBC 14.9 (H) 03/11/2023 03:55 AM    HGB 12.2 03/11/2023 03:55 AM    HCT 42.2 03/11/2023 03:55 AM    PLATELET 252 (H) 25/74/8371 03:55 AM    MCV 78.4 (L) 03/11/2023 03:55 AM      Lab Results   Component Value Date/Time    Sodium 138 03/11/2023 03:55 AM    Potassium 3.4 (L) 03/11/2023 03:55 AM    Chloride 100 03/11/2023 03:55 AM    CO2 30 03/11/2023 03:55 AM    Anion gap 8 03/11/2023 03:55 AM    Glucose 116 (H) 03/13/2023 05:08 AM    BUN 15 03/11/2023 03:55 AM    Creatinine 0.84 03/11/2023 03:55 AM    BUN/Creatinine ratio 18 03/11/2023 03:55 AM    GFR est AA >60 03/22/2021 11:53 AM    GFR est non-AA >60 03/22/2021 11:53 AM    Calcium 9.2 03/11/2023 03:55 AM    Bilirubin, total 0.3 03/11/2023 03:55 AM    Alk.  phosphatase 95 03/11/2023 03:55 AM    Protein, total 7.7 03/11/2023 03:55 AM    Albumin 3.4 (L) 03/11/2023 03:55 AM    Globulin 4.3 (H) 03/11/2023 03:55 AM    A-G Ratio 0.8 (L) 03/11/2023 03:55 AM    ALT (SGPT) 30 03/11/2023 03:55 AM      Vitals:    03/12/23 1252 03/12/23 1611 03/12/23 1936 03/13/23 0824   BP: 136/73 139/81 (!) 155/82 112/71   Pulse: 93 96 93 86   Resp: 18 16 16 17   Temp: 98.2 °F (36.8 °C) 98.3 °F (36.8 °C) 98.2 °F (36.8 °C) 97.3 °F (36.3 °C)   SpO2: 94% 95% 96% 95%   Weight:       Height:           Current Facility-Administered Medications   Medication Dose Route Frequency Provider Last Rate Last Admin    OLANZapine (ZyPREXA) tablet 5 mg  5 mg Oral Q6H PRN Bhupendra Laura, NP        haloperidol lactate (HALDOL) injection 5 mg  5 mg IntraMUSCular Q6H PRN Bhupendra Laura, NP        benztropine (COGENTIN) tablet 1 mg  1 mg Oral BID PRN Bhupendra Laura, NP        diphenhydrAMINE (BENADRYL) injection 50 mg  50 mg IntraMUSCular BID PRN Bhupendra Laura, NP        hydrOXYzine HCL (ATARAX) tablet 50 mg  50 mg Oral TID PRN Bhupendra Laura, NP        LORazepam (ATIVAN) 2 mg/mL injection 1 mg  1 mg IntraMUSCular Q4H PRN Bhupendra Laura, NP        traZODone (DESYREL) tablet 50 mg  50 mg Oral QHS PRN Bhupendra Laura, NP   50 mg at 03/12/23 2052    acetaminophen (TYLENOL) tablet 650 mg  650 mg Oral Q4H PRN Bhupendra Laura, NP   650 mg at 03/13/23 0915    magnesium hydroxide (MILK OF MAGNESIA) 400 mg/5 mL oral suspension 30 mL  30 mL Oral DAILY PRN Bhupendra Laura NP        loperamide (IMODIUM) capsule 2 mg  2 mg Oral Q6H PRN Bhupendra Laura, NP   2 mg at 03/12/23 1707    ondansetron (ZOFRAN ODT) tablet 4 mg  4 mg Oral Q8H PRN Bhupendra Laura, NP   4 mg at 03/12/23 1708    DULoxetine (CYMBALTA) capsule 60 mg  60 mg Oral BID Bhupendra Laura, NP   60 mg at 03/13/23 0915     Mental Status Exam:  Papi Chávez is a 49 y.o. obese female who is in poor grooming, in hospital gown, lying in bed.    Psychomotor activity is reduced  Speech is spontaneous, normal rate, volume, rhythm  Mood is \"OK\"  Affect: guarded  Perception: no AVH, no evidence of psychotic processes  Thought process: concrete  Thought content: Denies SI/plan/intent, denies HI/plan/intent  Insight/judgment: poor  Cognition is grossly intact    Physical Exam:  Body habitus: Body mass index is 66.45 kg/m². Musculoskeletal system: normal gait  Tremor - neg  Cog wheeling - neg    Assessment and Plan:  Newton Grewal meets criteria for a diagnosis of:   Mood disorder NOS, PTSD by hx, r/o malingering    3/13/23- Adding Gabapentin 300mg TID for pain management/anxiety, lidocaine patches PRN for pain. A coordinated, multidisplinary treatment team round was conducted with the patient, nurses, pharmcist,  and writer present. Discussions held with , and/or with family members; Complete current electronic health record for patient was reviewed in full including consultant notes, ancillary staff notes, nurses and tech notes, labs and vitals. I certify that this patients inpatient psychiatric hospital services furnished since the previous certification were, and continue to be, required for treatment that could reasonably be expected to improve the patient's condition, or for diagnostic study, and that the patient continues to need, on a daily basis, active treatment furnished directly by or requiring the supervision of inpatient psychiatric facility personnel. In addition, the hospital records show that services furnished were intensive treatment services, admission or related services, or equivalent services.

## 2023-03-13 NOTE — PROGRESS NOTES
Problem: Depressed Mood (Adult/Pediatric)  Goal: *STG: Participates in treatment plan  Outcome: Progressing Towards Goal   Received this morning resting in bed. Is alert and oriented. Thoughts are clear and organized. Is demanding this morning, requesting breakfast to be brought to her in her room. Refused come out to dining room for breakfast. \"You can't deny me my breakfast. Your not a doctor and your not my mama. \"  Staff attempted to explain unit rules and the importance of getting up, but patient remained defiant. After having breakfast in room, PT came to work with patient and patient got up without difficulties, using her walker/rollator. Was medication and meal compliant. Returned back to bed after working with PT and has remained isolative all morning. Staff to continue to encourage coming out of rr and joining in unit/group activities.

## 2023-03-13 NOTE — BH NOTES
GROUP THERAPY PROGRESS NOTE    Patient did not participate in Leisure Education group. RICARDO Aguilar

## 2023-03-13 NOTE — BH NOTES
PRN Medication Documentation    Specific patient behavior that led to need for PRN medication: pt having difficulty falling asleep  Staff interventions attempted prior to PRN being given: med education  PRN medication given: trazodone 50 mg po  Patient response/effectiveness of PRN medication: staff will continue to monitor and support.

## 2023-03-13 NOTE — PROGRESS NOTES
Problem: Falls - Risk of  Goal: *Absence of Falls  Description: Document Oh Perezandra Fall Risk and appropriate interventions in the flowsheet. Outcome: Progressing Towards Goal  Note: Fall Risk Interventions:     Received patient in bed. Appears to be sleeping, no signs of stress. Respirations even and unlabored. Will continue to monitor with q15min safety rounds.

## 2023-03-13 NOTE — PROGRESS NOTES
Problem: Falls - Risk of  Goal: *Absence of Falls  Description: Document Lyndon Massey Fall Risk and appropriate interventions in the flowsheet. Outcome: Progressing Towards Goal  Note: Fall Risk Interventions:        Able to ambulate with PT using walker/rollator this morning. Staff o encourage getting up more and ambulating on the unit. Staff to maintain safety during hospitalization.

## 2023-03-13 NOTE — INTERDISCIPLINARY ROUNDS
Behavioral Health Interdisciplinary Rounds     Patient Name: Jamia Davies  Age: 49 y.o. Room/Bed:  742/02  Primary Diagnosis: <principal problem not specified>   Admission Status: Voluntary     Readmission within 30 days: no  Power of  in place: no  Patient requires a blocked bed: no          Reason for blocked bed:       Flu Vaccine : no   Consults:          Labs/Testing due today? Have they refused labs?: yes    Sleep hours:  7+      Participation in Care/Groups:  n/a  Medication Compliant?: Yes  PRNS (last 24 hours): Anticholinergic, Sleep Aid, Pain, and Diarrhea     Restraints (last 24 hours):  no     CIWA (range last 24 hours):     COWS (range last 24 hours):      Alcohol screening (AUDIT) completed -   AUDIT Score: 0     If applicable, date SBIRT discussed in treatment team AND documented:   AUDIT Screen Score: AUDIT Score: 0    ________________________________________________________________  OQ Admission Analysis Survey completed:  OQ Admission Analysis Survey score:  OQ Discharge Analysis Survey completed:  OQ Discharge Analysis Survey score:     _____________________________________________________________________    Document Brief Intervention (corresponds directly with the 5 A's, Ask, Advise, Assess, Assist, and Arrange): At- Risk Patients (Score 7-15 for women; 8-15 for men)  Discuss concern patient is drinking at unhealthy levels known to increase risk of alcohol-related health problems. Is Patient ready to commit to change?      If No:  Encourage reflection  Discuss short term and long term health risks of consuming alcohol  Barriers to change  Reaffirm willingness to help / Educational materials provided  If Yes:  Set goal  Plan  Educational materials provided    Harmful use or Dependence (Score 16 or greater)  Discuss short term and long term health risks of consuming alcohol  Recommendations  Negotiate drinking goal  Recommend addiction specialist/center  Arrange follow-up appointments. Tobacco - patient is a smoker: Have You Used Tobacco in the Past 30 Days: No  Illegal Drugs use: Have You Used Any Illegal Substances Over the Past 12 Months: No    24 hour chart check complete: yes   ____________________________________________________________________________________________________________    Patient goal(s) for today: Communicate needs to staff   Treatment team focus/goals: Assess pt, manage medications, discharge planning   Progress note Pt reports depressive symptoms and pain. Pt has no place to return to. SW attempted to speak with CM at [address] [place] [address] Adams Zairge. L/M  Pt has open authorization for [address] skill building. LOS:  2 Expected LOS: TBD     Financial concerns/prescription coverage:    Family contact:       Family requesting physician contact today:    Discharge plan: TBD   Access to weapons :  No        Outpatient provider(s): [facility]   Patient's preferred phone number for follow up call :   Patient's preferred e-mail address :  Participating treatment team members:  Sukumar Curiel RN, Berto Kline NP

## 2023-03-14 LAB
FLUAV RNA SPEC QL NAA+PROBE: NOT DETECTED
FLUBV RNA SPEC QL NAA+PROBE: NOT DETECTED
SARS-COV-2 RNA RESP QL NAA+PROBE: NOT DETECTED

## 2023-03-14 PROCEDURE — 65220000003 HC RM SEMIPRIVATE PSYCH

## 2023-03-14 PROCEDURE — 74011000250 HC RX REV CODE- 250: Performed by: NURSE PRACTITIONER

## 2023-03-14 PROCEDURE — 87636 SARSCOV2 & INF A&B AMP PRB: CPT

## 2023-03-14 PROCEDURE — 74011250636 HC RX REV CODE- 250/636: Performed by: NURSE PRACTITIONER

## 2023-03-14 PROCEDURE — 74011250637 HC RX REV CODE- 250/637: Performed by: NURSE PRACTITIONER

## 2023-03-14 RX ADMIN — DULOXETINE HYDROCHLORIDE 60 MG: 60 CAPSULE, DELAYED RELEASE ORAL at 09:02

## 2023-03-14 RX ADMIN — TRAZODONE HYDROCHLORIDE 50 MG: 50 TABLET ORAL at 20:12

## 2023-03-14 RX ADMIN — LOPERAMIDE HYDROCHLORIDE 2 MG: 2 CAPSULE ORAL at 09:10

## 2023-03-14 RX ADMIN — GABAPENTIN 300 MG: 300 CAPSULE ORAL at 09:02

## 2023-03-14 RX ADMIN — GABAPENTIN 300 MG: 300 CAPSULE ORAL at 16:40

## 2023-03-14 RX ADMIN — ONDANSETRON 4 MG: 4 TABLET, ORALLY DISINTEGRATING ORAL at 09:10

## 2023-03-14 RX ADMIN — ACETAMINOPHEN 650 MG: 325 TABLET ORAL at 20:06

## 2023-03-14 RX ADMIN — ACETAMINOPHEN 650 MG: 325 TABLET ORAL at 09:09

## 2023-03-14 RX ADMIN — DULOXETINE HYDROCHLORIDE 60 MG: 60 CAPSULE, DELAYED RELEASE ORAL at 20:06

## 2023-03-14 RX ADMIN — GABAPENTIN 300 MG: 300 CAPSULE ORAL at 20:06

## 2023-03-14 NOTE — BH NOTES
GROUP THERAPY PROGRESS NOTE    Patient did not participate in Leisure Education group. RICARDO Dunn.

## 2023-03-14 NOTE — BH NOTES
GROUP THERAPY PROGRESS NOTE    Patient did not participate in Healthy living and Wellness group. RICARDO Dunn.

## 2023-03-14 NOTE — BH NOTES
GROUP THERAPY PROGRESS NOTE    Patient did not participate in Healthy living and Wellness group. RICARDO Rajput.

## 2023-03-14 NOTE — PROGRESS NOTES
Problem: Depressed Mood (Adult/Pediatric)  Goal: *STG: Participates in treatment plan  Outcome: Progressing Towards Goal  Note: Patient participates in treatment plan. Medications and discharge were discussed. Patient is out on the unit, med and meal compliant, appropriate, thoughts organized. Patient's goal is to prepare for discharge tomorrow. Staff's goal is to support patient. Goal: *STG: Complies with medication therapy  Outcome: Progressing Towards Goal     Problem: Falls - Risk of  Goal: *Absence of Falls  Description: Document Rajat Fall Risk and appropriate interventions in the flowsheet.   Note: Fall Risk Interventions:

## 2023-03-14 NOTE — INTERDISCIPLINARY ROUNDS
Behavioral Health Interdisciplinary Rounds     Patient Name: Anjum Murphy  Age: 49 y.o. Room/Bed:  2/  Primary Diagnosis: <principal problem not specified>   Admission Status: Voluntary     Readmission within 30 days: no  Power of  in place: no  Patient requires a blocked bed: no          Reason for blocked bed:   Sleep hours: 7+       Participation in Care/Groups:  no  Medication Compliant?: Yes  PRNS (last 24 hours): Sleep Aid and Pain    Restraints (last 24 hours):  no  __________________________________________________  OQ Admission Analysis Survey completed:  OQ Admission Analysis Survey score:  OQ Discharge Analysis Survey completed:  OQ Discharge Analysis Survey score:   __________________________________________________     Alcohol screening (AUDIT) completed -  AUDIT Score: 0  If applicable, date SBIRT discussed in treatment team AND documented:    Tobacco - patient is a smoker: Have You Used Tobacco in the Past 30 Days: No  Illegal Drugs use: Have You Used Any Illegal Substances Over the Past 12 Months: No    24 hour chart check complete: yes     _______________________________________________    Patient goal(s) for today:   Treatment team focus/goals:   Progress note:      Spiritual Care Consult:   Financial concerns/prescription coverage:    Family contact:                        Family requesting physician contact today:    Discharge plan:   Access to weapons :                                                              Outpatient provider(s):   Patient's preferred phone number for follow up call :   Patient's preferred e-mail address :    LOS:  3  Expected LOS:     Participating treatment team members:  Anjum Murphy, * (assigned SW),

## 2023-03-14 NOTE — PROGRESS NOTES
Problem: Falls - Risk of  Goal: *Absence of Falls  Description: Document Jabarikarsten Pascual Fall Risk and appropriate interventions in the flowsheet. Outcome: Progressing Towards Goal  Note: Fall Risk Interventions:    Patient is resting quietly in bed with eyes closed. Appears to be asleep. No acute or respiratory distress noted. Will continue to monitor q15 min rounds.

## 2023-03-15 VITALS
WEIGHT: 293 LBS | RESPIRATION RATE: 15 BRPM | HEART RATE: 83 BPM | DIASTOLIC BLOOD PRESSURE: 73 MMHG | TEMPERATURE: 97.9 F | HEIGHT: 62 IN | OXYGEN SATURATION: 96 % | BODY MASS INDEX: 53.92 KG/M2 | SYSTOLIC BLOOD PRESSURE: 103 MMHG

## 2023-03-15 PROCEDURE — 74011250637 HC RX REV CODE- 250/637: Performed by: NURSE PRACTITIONER

## 2023-03-15 PROCEDURE — 74011250636 HC RX REV CODE- 250/636: Performed by: NURSE PRACTITIONER

## 2023-03-15 RX ORDER — DULOXETIN HYDROCHLORIDE 60 MG/1
60 CAPSULE, DELAYED RELEASE ORAL 2 TIMES DAILY
Qty: 60 CAPSULE | Refills: 0 | Status: SHIPPED | OUTPATIENT
Start: 2023-03-15

## 2023-03-15 RX ORDER — GABAPENTIN 300 MG/1
300 CAPSULE ORAL 3 TIMES DAILY
Qty: 90 CAPSULE | Refills: 0 | Status: SHIPPED | OUTPATIENT
Start: 2023-03-15

## 2023-03-15 RX ORDER — LIDOCAINE 4 G/100G
PATCH TOPICAL
Qty: 60 PATCH | Refills: 0 | Status: SHIPPED | OUTPATIENT
Start: 2023-03-15

## 2023-03-15 RX ADMIN — ACETAMINOPHEN 650 MG: 325 TABLET ORAL at 07:42

## 2023-03-15 RX ADMIN — ONDANSETRON 4 MG: 4 TABLET, ORALLY DISINTEGRATING ORAL at 07:42

## 2023-03-15 RX ADMIN — DULOXETINE HYDROCHLORIDE 60 MG: 60 CAPSULE, DELAYED RELEASE ORAL at 09:43

## 2023-03-15 RX ADMIN — GABAPENTIN 300 MG: 300 CAPSULE ORAL at 09:43

## 2023-03-15 NOTE — INTERDISCIPLINARY ROUNDS
Behavioral Health Interdisciplinary Rounds     Patient Name: Mike Baptiste  Age: 49 y.o. Room/Bed:  742/  Primary Diagnosis: <principal problem not specified>   Admission Status: Voluntary     Readmission within 30 days: no  Power of  in place: no  Patient requires a blocked bed: no          Reason for blocked bed:   Sleep hours: 7+       Participation in Care/Groups:  yes  Medication Compliant?: Yes  PRNS (last 24 hours): Sleep Aid and Pain    Restraints (last 24 hours):  no  __________________________________________________  OQ Admission Analysis Survey completed:  OQ Admission Analysis Survey score:  OQ Discharge Analysis Survey completed:  OQ Discharge Analysis Survey score:   __________________________________________________     Alcohol screening (AUDIT) completed -  AUDIT Score: 0  If applicable, date SBIRT discussed in treatment team AND documented:    Tobacco - patient is a smoker: Have You Used Tobacco in the Past 30 Days: No  Illegal Drugs use: Have You Used Any Illegal Substances Over the Past 12 Months: No    24 hour chart check complete: yes     _______________________________________________    Patient goal(s) for today:   Treatment team focus/goals:   Progress note: Pt met with treatment team and appeared with a calm and pleasant mood and affect. Pt denies SI/HI and [facility] at this time, denies side effects from medications. Patient is present in the milieu and is pleasantly engaged with staff and peers, no concerns noted at this time. Plan for patient to discharge this afternoon to [facility], medications to be filled through [facility].      Spiritual Care Consult:   Financial concerns/prescription coverage:    Family contact:                        Family requesting physician contact today:    Discharge plan:   Access to weapons :                                                              Outpatient provider(s):   Patient's preferred phone number for follow up call :   Patient's preferred e-mail address :    LOS:  4  Expected LOS: 4    Participating treatment team members:  KIRTI Bonilla, Pricilla Vieyra RN, Juan Jose Heard NP, Laura NicholsD

## 2023-03-15 NOTE — BH NOTES
Behavioral Health Transition Record to Provider    Patient Name: Mike Baptiste  YOB: 1975  Medical Record Number: [id number]  Date of Admission: 3/11/2023  Date of Discharge: 3/15/2023    Attending Provider: Hugo Oakley MD  Discharging Provider: Meng Keenan NP  To contact this individual call [phone] and ask the  to page. If unavailable, ask to be transferred to [address] [place] [address] Provider on call. [facility] Provider will be available on call 24/7 and during holidays. Primary Care Provider: Claire Payton NP    Allergies   Allergen Reactions    Aspirin Unknown (comments)    Ibuprofen Unknown (comments)    Lisinopril Unknown (comments)    Nsaids (Non-Steroidal Anti-Inflammatory Drug) Other (comments)    Tramadol Other (comments)       Reason for Admission: CHIEF COMPLAINT: \"I'm not good. \"      HISTORY OF PRESENTING COMPLAINT:  This is a 49 y.o. female who is currently admitted to the psychiatric floor at [facility] on a voluntary basis for suicidal thoughts with plan to cut self or jump into traffic. Pt became homeless two days ago after living with her cousins, but she was asked to leave their place. She reports experiencing depressive symptoms and suicidal ideation on and off since January, largely triggered by pain as well as fears about homelessness and not being able to care for herself. Currently, she endorses SI, but no plan/intent at this time. Denies HI/plan/intent, denies AVH.      Admission Diagnosis: Major depression [F32.9]    * No surgery found *    Results for orders placed or performed during the hospital encounter of 03/11/23   COVID-19 WITH INFLUENZA A/B   Result Value Ref Range    SARS-CoV-2 by PCR Not detected NOTD      Influenza A by PCR Not detected NOTD      Influenza B by PCR Not detected NOTD     COVID-19 WITH INFLUENZA A/B   Result Value Ref Range    SARS-CoV-2 by PCR Not detected NOTD      Influenza A by PCR Not detected NOTD Influenza B by PCR Not detected NOTD     CBC WITH AUTOMATED DIFF   Result Value Ref Range    WBC 14.9 (H) 3.6 - 11.0 K/uL    RBC 5.38 (H) 3.80 - 5.20 M/uL    HGB 12.2 11.5 - 16.0 g/dL    HCT 42.2 35.0 - 47.0 %    MCV 78.4 (L) 80.0 - 99.0 FL    MCH 22.7 (L) 26.0 - 34.0 PG    MCHC 28.9 (L) 30.0 - 36.5 g/dL    RDW 19.1 (H) 11.5 - 14.5 %    PLATELET 436 (H) 249 - 400 K/uL    MPV 10.4 8.9 - 12.9 FL    NRBC 0.0 0  WBC    ABSOLUTE NRBC 0.00 0.00 - 0.01 K/uL    NEUTROPHILS 46 32 - 75 %    LYMPHOCYTES 46 12 - 49 %    MONOCYTES 6 5 - 13 %    EOSINOPHILS 1 0 - 7 %    BASOPHILS 1 0 - 1 %    IMMATURE GRANULOCYTES 0 0.0 - 0.5 %    ABS. NEUTROPHILS 6.9 1.8 - 8.0 K/UL    ABS. LYMPHOCYTES 6.9 (H) 0.8 - 3.5 K/UL    ABS. MONOCYTES 0.9 0.0 - 1.0 K/UL    ABS. EOSINOPHILS 0.1 0.0 - 0.4 K/UL    ABS. BASOPHILS 0.1 0.0 - 0.1 K/UL    ABS. IMM. GRANS. 0.0 0.00 - 0.04 K/UL    DF SMEAR SCANNED      RBC COMMENTS ANISOCYTOSIS  1+        RBC COMMENTS HYPOCHROMIA  2+        RBC COMMENTS MICROCYTOSIS  1+        WBC COMMENTS REACTIVE LYMPHS     METABOLIC PANEL, COMPREHENSIVE   Result Value Ref Range    Sodium 138 136 - 145 mmol/L    Potassium 3.4 (L) 3.5 - 5.1 mmol/L    Chloride 100 97 - 108 mmol/L    CO2 30 21 - 32 mmol/L    Anion gap 8 5 - 15 mmol/L    Glucose 170 (H) 65 - 100 mg/dL    BUN 15 6 - 20 MG/DL    Creatinine 0.84 0.55 - 1.02 MG/DL    BUN/Creatinine ratio 18 12 - 20      eGFR >60 >60 ml/min/1.73m2    Calcium 9.2 8.5 - 10.1 MG/DL    Bilirubin, total 0.3 0.2 - 1.0 MG/DL    ALT (SGPT) 30 12 - 78 U/L    AST (SGOT) 13 (L) 15 - 37 U/L    Alk.  phosphatase 95 45 - 117 U/L    Protein, total 7.7 6.4 - 8.2 g/dL    Albumin 3.4 (L) 3.5 - 5.0 g/dL    Globulin 4.3 (H) 2.0 - 4.0 g/dL    A-G Ratio 0.8 (L) 1.1 - 2.2     SALICYLATE   Result Value Ref Range    Salicylate level <8.5 (L) 2.8 - 20.0 MG/DL   ACETAMINOPHEN   Result Value Ref Range    Acetaminophen level <2 (L) 10 - 30 ug/mL   DRUG SCREEN, URINE   Result Value Ref Range    AMPHETAMINES Negative NEG      BARBITURATES Negative NEG      BENZODIAZEPINES Negative NEG      COCAINE Negative NEG      METHADONE Negative NEG      OPIATES Negative NEG      PCP(PHENCYCLIDINE) Negative NEG      THC (TH-CANNABINOL) Negative NEG      Drug screen comment (NOTE)    ETHYL ALCOHOL   Result Value Ref Range    ALCOHOL(ETHYL),SERUM <10 <10 MG/DL   SAMPLES BEING HELD   Result Value Ref Range    SAMPLES BEING HELD  1 BLUE     COMMENT        Add-on orders for these samples will be processed based on acceptable specimen integrity and analyte stability, which may vary by analyte.    URINALYSIS W/ REFLEX CULTURE    Specimen: Urine   Result Value Ref Range    Color YELLOW/STRAW      Appearance CLOUDY (A) CLEAR      Specific gravity 1.023 1.003 - 1.030      pH (UA) 7.0 5.0 - 8.0      Protein Negative NEG mg/dL    Glucose Negative NEG mg/dL    Ketone Negative NEG mg/dL    Bilirubin Negative NEG      Blood Negative NEG      Urobilinogen 0.2 0.2 - 1.0 EU/dL    Nitrites Negative NEG      Leukocyte Esterase Negative NEG      WBC 0-4 0 - 4 /hpf    RBC 0-5 0 - 5 /hpf    Epithelial cells MANY (A) FEW /lpf    Bacteria 1+ (A) NEG /hpf    UA:UC IF INDICATED CULTURE NOT INDICATED BY UA RESULT CNI     TSH 3RD GENERATION   Result Value Ref Range    TSH 2.83 0.36 - 3.74 uIU/mL   GLUCOSE, FASTING   Result Value Ref Range    Glucose 116 (H) 65 - 100 MG/DL   LIPID PANEL   Result Value Ref Range    Cholesterol, total 150 <200 MG/DL    Triglyceride 133 <150 MG/DL    HDL Cholesterol 43 MG/DL    LDL, calculated 80.4 0 - 100 MG/DL    VLDL, calculated 26.6 MG/DL    CHOL/HDL Ratio 3.5 0.0 - 5.0     HCG URINE, QL. - POC   Result Value Ref Range    Pregnancy test,urine (POC) Negative NEG     HCG URINE, QL. - POC   Result Value Ref Range    Pregnancy test,urine (POC) Negative NEG     EKG, 12 LEAD, INITIAL   Result Value Ref Range    Ventricular Rate 100 BPM    Atrial Rate 100 BPM    P-R Interval 130 ms    QRS Duration 102 ms    Q-T Interval 382 ms    QTC Calculation (Bezet) 492 ms    Calculated P Axis 66 degrees    Calculated R Axis 31 degrees    Calculated T Axis -7 degrees    Diagnosis       Normal sinus rhythm  RSR' or QR pattern in V1 suggests right ventricular conduction delay  Inferior Q waves of questionable significance Nonspecific T wave abnormality   Prolonged QT  Abnormal ECG  No previous ECGs available  Confirmed by Moe Goldstein M.D., Chadletty Hansen ([id number]) on 3/11/2023 11:52:08 AM         Immunizations administered during this encounter:   Immunization History   Administered Date(s) Administered    PPD 03/21/2012       Screening for Metabolic Disorders for Patients on Antipsychotic Medications  (Data obtained from the EMR)    Estimated Body Mass Index  Estimated body mass index is 66.45 kg/m² as calculated from the following:    Height as of this encounter: 5' 2\" (1.575 m). Weight as of this encounter: 164.8 kg (363 lb 5.1 oz). Vital Signs/Blood Pressure  Visit Vitals  /73   Pulse 83   Temp 97.9 °F (36.6 °C)   Resp 15   Ht 5' 2\" (1.575 m)   Wt (!) 164.8 kg (363 lb 5.1 oz)   LMP  (LMP Unknown)   SpO2 96%   Breastfeeding No   BMI 66.45 kg/m²       Blood Glucose/Hemoglobin A1c  Lab Results   Component Value Date/Time    Glucose 116 (H) 03/13/2023 05:08 AM    Glucose (POC) 324 (H) 03/17/2021 02:57 PM       Lab Results   Component Value Date/Time    Hemoglobin A1c 6.1 (H) 05/07/2019 02:16 PM        Lipid Panel  Lab Results   Component Value Date/Time    Cholesterol, total 150 03/13/2023 05:08 AM    HDL Cholesterol 43 03/13/2023 05:08 AM    LDL, calculated 80.4 03/13/2023 05:08 AM    Triglyceride 133 03/13/2023 05:08 AM    CHOL/HDL Ratio 3.5 03/13/2023 05:08 AM        Discharge Diagnosis: Mood disorder NOS, PTSD by hx, r/o malingering    Discharge Plan: The patient Ismael Chen exhibits the ability to control behavior in a less restrictive environment. Patient's level of functioning is improving.   No assaultive/destructive behavior has been observed for the past 24 hours. No suicidal/homicidal threat or behavior has been observed for the past 24 hours. There is no evidence of serious medication side effects. Patient has not been in physical or protective restraints for at least the past 24 hours. If weapons involved, how are they secured? None    Is patient aware of and in agreement with discharge plan? Yes    Arrangements for medication:  Prescriptions given to patient, given a 30 day supply filled through [facility]. Copy of discharge instructions to provider?: Yes    Arrangements for transportation home: [facility]    Keep all follow up appointments as scheduled, continue to take prescribed medications per physician instructions. Mental health crisis number:  076 or your local mental health crisis line number at [facility] at [phone]    Discharge Medication List and Instructions:   Current Discharge Medication List        START taking these medications    Details   lidocaine 4 % patch Apply patch to back . Apply patch to the affected area for 12 hours a day and remove for 12 hours a day. Indications: pain  Qty: 60 Patch, Refills: 0  Start date: 3/15/2023      gabapentin (NEURONTIN) 300 mg capsule Take 1 Capsule by mouth three (3) times daily. Max Daily Amount: 900 mg. Indications: neuropathic pain  Qty: 90 Capsule, Refills: 0  Start date: 3/15/2023    Associated Diagnoses: Acute low back pain without sciatica, unspecified back pain laterality; Chronic pain syndrome; Fibromyalgia           CONTINUE these medications which have CHANGED    Details   DULoxetine (CYMBALTA) 60 mg capsule Take 1 Capsule by mouth two (2) times a day.  Indications: anxiousness associated with depression  Qty: 60 Capsule, Refills: 0  Start date: 3/15/2023           STOP taking these medications       lidocaine (LIDODERM) 5 % Comments:   Reason for Stopping:         methocarbamoL (ROBAXIN) 750 mg tablet Comments:   Reason for Stopping:         LORazepam (ATIVAN) 1 mg tablet Comments:   Reason for Stopping:         HYDROcodone-acetaminophen (NORCO) 5-325 mg per tablet Comments:   Reason for Stopping:         ondansetron (ZOFRAN ODT) 4 mg disintegrating tablet Comments:   Reason for Stopping:         valsartan-hydroCHLOROthiazide (DIOVAN-HCT) 160-12.5 mg per tablet Comments:   Reason for Stopping:         albuterol (Proventil HFA) 90 mcg/actuation inhaler Comments:   Reason for Stopping:               Unresulted Labs (24h ago, onward)      None          To obtain results of studies pending at discharge, please contact [phone]    Follow-up Information       Follow up With Specialties Details Why Contact Info    Trini Oneal NP Family Medicine   [address]  [address]  [address]  [phone]      [address] [facility]  If symptoms worsen [address] [facility]  [phone]    [facility]  Go today Go to today for community stabilization services and then continue with mental health skill building services [address] [place], [address] [place] [address]    Phone: [phone]  Fax: [phone]            Advanced Directive:   Does the patient have an appointed surrogate decision maker? No  Does the patient have a Medical Advance Directive? No  Does the patient have a Psychiatric Advance Directive? No  If the patient does not have a surrogate or Medical Advance Directive AND Psychiatric Advance Directive, the patient was offered information on these advance directives Yes and Patient declined to complete    Patient Instructions: Please continue all medications until otherwise directed by physician. Tobacco Cessation Discharge Plan:   Is the patient a smoker and needs referral for smoking cessation? No  Patient referred to the following for smoking cessation with an appointment? Not applicable     Patient was offered medication to assist with smoking cessation at discharge?  Not applicable  Was education for smoking cessation added to the discharge instructions? Yes    Alcohol/Substance Abuse Discharge Plan:   Does the patient have a history of substance/alcohol abuse and requires a referral for treatment? No  Patient referred to the following for substance/alcohol abuse treatment with an appointment? Not applicable  Patient was offered medication to assist with alcohol cessation at discharge? Not applicable  Was education for substance/alcohol abuse added to discharge instructions? Not applicable    Patient discharged to Home; discussed with patient/caregiver and provided to the patient/caregiver either in hard copy or electronically.

## 2023-03-15 NOTE — BH NOTES
PSYCHIATRIC PROGRESS NOTE    Patient: Chelsea Cummings MRN: [id number]  SSN: [id number]    YOB: 1975  Age: 49 y.o. Sex: female      Admit Date: 3/11/2023    Length of Stay: 4 Days    Chief Complaint: \"I'm OK. \"     Interval History:   3/15/23- Brenna states she is feeling good. She is feeling stable for discharge today. She denies SI/plan/intent, HI/plan/intent, denies AVH. She is eating and sleeping well. Feels ready to discharge to [address] [place] [address]. Pain is well controlled on Gabapentin. No other changes or new concerns at this time. 3/14/23- Brenna states she is feeling better. She reports the Gabapentin has been effective at controlling her pain. She feels her mood is improved. Denies SI/plan/intent, HI/plan/intent, and AVH. Eating and sleeping well. Denies medication side effects. Interacting appropriately with peers and staff. No other changes or new concerns at this time. She is agreeable to discharge to a [facility]SU today or tomorrow. 3/13/23- Brenna states she is not doing well. She is still having pain. She states mood is low because of her pain and also because of worries about homelessness. Pt denies SI/plan/intent, denies HI/plan/intent, denies AVH, no evidence of any psychotic processes observed. No complaints reported with eating or sleeping. Chelsea Cummings has been compliant with medications and finds them beneficial. Denies adverse effects. Denies other changes or new concerns or questions at this time.      Past Medical History:  Past Medical History:   Diagnosis Date    Asthma     Chronic pain     Depression     Fibromyalgia     Hypertension        Labs:  Lab Results   Component Value Date/Time    WBC 14.9 (H) 03/11/2023 03:55 AM    HGB 12.2 03/11/2023 03:55 AM    HCT 42.2 03/11/2023 03:55 AM    PLATELET 455 (H) 75/78/7947 03:55 AM    MCV 78.4 (L) 03/11/2023 03:55 AM      Lab Results   Component Value Date/Time    Sodium 138 03/11/2023 03:55 AM    Potassium 3.4 (L) 03/11/2023 03:55 AM Chloride 100 03/11/2023 03:55 AM    CO2 30 03/11/2023 03:55 AM    Anion gap 8 03/11/2023 03:55 AM    Glucose 116 (H) 03/13/2023 05:08 AM    BUN 15 03/11/2023 03:55 AM    Creatinine 0.84 03/11/2023 03:55 AM    BUN/Creatinine ratio 18 03/11/2023 03:55 AM    GFR est AA >60 03/22/2021 11:53 AM    GFR est non-AA >60 03/22/2021 11:53 AM    Calcium 9.2 03/11/2023 03:55 AM    Bilirubin, total 0.3 03/11/2023 03:55 AM    Alk.  phosphatase 95 03/11/2023 03:55 AM    Protein, total 7.7 03/11/2023 03:55 AM    Albumin 3.4 (L) 03/11/2023 03:55 AM    Globulin 4.3 (H) 03/11/2023 03:55 AM    A-G Ratio 0.8 (L) 03/11/2023 03:55 AM    ALT (SGPT) 30 03/11/2023 03:55 AM      Vitals:    03/14/23 0749 03/14/23 1825 03/14/23 1946 03/15/23 0757   BP: (!) 153/88 (!) 123/90 (!) 171/81 103/73   Pulse: 88 96 84 83   Resp: 18 16 16 15   Temp: 97.5 °F (36.4 °C) 98.9 °F (37.2 °C) 98.2 °F (36.8 °C) 97.9 °F (36.6 °C)   SpO2: 95% 96% 96% 96%   Weight:       Height:           Current Facility-Administered Medications   Medication Dose Route Frequency Provider Last Rate Last Admin    gabapentin (NEURONTIN) capsule 300 mg  300 mg Oral TID Tiajuana Blight, NP   300 mg at 03/15/23 0943    lidocaine 4 % patch 2 Patch  2 Patch TransDERmal Q24H Tiajuana Blight, NP   2 Patch at 03/14/23 1301    OLANZapine (ZyPREXA) tablet 5 mg  5 mg Oral Q6H PRN Tiajuana Blight, NP        haloperidol lactate (HALDOL) injection 5 mg  5 mg IntraMUSCular Q6H PRN Tiajuana Blight, NP        benztropine (COGENTIN) tablet 1 mg  1 mg Oral BID PRN Tiajuana Blight, NP        diphenhydrAMINE (BENADRYL) injection 50 mg  50 mg IntraMUSCular BID PRN Tiajuana Blight, NP        hydrOXYzine HCL (ATARAX) tablet 50 mg  50 mg Oral TID PRN Tiajuana Blight, NP        LORazepam (ATIVAN) 2 mg/mL injection 1 mg  1 mg IntraMUSCular Q4H PRN Tiajuana Blight, NP        traZODone (DESYREL) tablet 50 mg  50 mg Oral QHS PRN Tiajuana Blight, NP   50 mg at 03/14/23 2012    acetaminophen (TYLENOL) tablet 650 mg  650 mg Oral Q4H PRN Starleen Im, NP   650 mg at 03/15/23 4263    magnesium hydroxide (MILK OF MAGNESIA) 400 mg/5 mL oral suspension 30 mL  30 mL Oral DAILY PRN Starleen Im, NP        loperamide (IMODIUM) capsule 2 mg  2 mg Oral Q6H PRN Starleen Im, NP   2 mg at 03/14/23 0910    ondansetron (ZOFRAN ODT) tablet 4 mg  4 mg Oral Q8H PRN Starleen Im, NP   4 mg at 03/15/23 2817    DULoxetine (CYMBALTA) capsule 60 mg  60 mg Oral BID Starleen Im, NP   60 mg at 03/15/23 6934     Mental Status Exam:  Anjum Murphy is a 49 y.o. obese female who is in fair grooming, in casual attire  Psychomotor activity is WNL  Speech is spontaneous, normal rate, volume, rhythm  Mood is \"OK\"  Affect: guarded  Perception: no AVH, no evidence of psychotic processes  Thought process: concrete  Thought content: Denies SI/plan/intent, denies HI/plan/intent  Insight/judgment: fair  Cognition is grossly intact    Physical Exam:  Body habitus: Body mass index is 66.45 kg/m². Musculoskeletal system: normal gait  Tremor - neg  Cog wheeling - neg    Assessment and Plan:  Anjum Murphy meets criteria for a diagnosis of:   Mood disorder NOS, PTSD by hx, r/o malingering    3/15/23- Discharge today to [facility].     3/14/23- No medication changes, discharge today or tomorrow to [facility]    3/13/23- Adding Gabapentin 300mg TID for pain management/anxiety, lidocaine patches PRN for pain. A coordinated, multidisplinary treatment team round was conducted with the patient, nurses, pharmcist,  and writer present. Discussions held with , and/or with family members; Complete current electronic health record for patient was reviewed in full including consultant notes, ancillary staff notes, nurses and tech notes, labs and vitals.   I certify that this patients inpatient psychiatric hospital services furnished since the previous certification were, and continue to be, required for treatment that could reasonably be expected to improve the patient's condition, or for diagnostic study, and that the patient continues to need, on a daily basis, active treatment furnished directly by or requiring the supervision of inpatient psychiatric facility personnel. In addition, the hospital records show that services furnished were intensive treatment services, admission or related services, or equivalent services.

## 2023-03-15 NOTE — PROGRESS NOTES
Problem: Depressed Mood (Adult/Pediatric)  Goal: *STG: Participates in treatment plan  Outcome: Progressing Towards Goal  Note: Out on unit engaged w a smile. Future focused, actively working with d/c planning. Denies SI, no self harming behaviors. Verbalized understanding of d/c plans.  Staff focus is on offering support  Goal: *STG: Verbalizes anger, guilt, and other feelings in a constructive manor  Outcome: Progressing Towards Goal  Goal: *STG: Attends activities and groups  Outcome: Progressing Towards Goal  Goal: Interventions  Outcome: Progressing Towards Goal

## 2023-03-15 NOTE — BH NOTES
GROUP THERAPY PROGRESS NOTE    Patient did not participate in Coping Skills group.     KIRTI Carter, [facility]

## 2023-03-15 NOTE — DISCHARGE SUMMARY
DISCHARGE SUMMARY    Some parts of the discharge summary are from the initial Psychiatric interview that was done on admission by the admitting psychiatrist.     Date of Admission: 3/11/2023    Date of Discharge: 3/15/2023     TYPE OF DISCHARGE:   REGULAR     INITIAL PSYCHIATRIC INTERVIEW:  CHIEF COMPLAINT: \"I'm not good. \"      HISTORY OF PRESENTING COMPLAINT:  This is a 49 y.o. female who is currently admitted to the psychiatric floor at [facility] on a voluntary basis for suicidal thoughts with plan to cut self or jump into traffic. Pt became homeless two days ago after living with her cousins, but she was asked to leave their place. She reports experiencing depressive symptoms and suicidal ideation on and off since January, largely triggered by pain as well as fears about homelessness and not being able to care for herself. Currently, she endorses SI, but no plan/intent at this time. Denies HI/plan/intent, denies AVH. PAST PSYCHIATRIC HISTORY: Pt reports a hx of depression, anxiety, and PTSD. Denies a hx of past suicide attempts. Reports hx of past psychiatric admissions, once at [facility] earlier this year, twice at [facility]. SUBSTANCE ABUSE HISTORY: Denies substance abuse. UDS negative. PSYCHOSOCIAL HISTORY: Pt is newly homeless. She has been staying with family until recently, and has nowhere to stay currently. She has one 27year old son who is not in her life. She has a weak support system. PAST MEDICAL HISTORY: Reviewed per H&P. ALLERGIES: NKDA     MENTAL STATUS EXAM: The patient is a 49 y.o. obese female who is in poor grooming, lying on a hospital bed. The patient was  dysphoric. She endorsed thoughts of suicide without plan. She denied thoughts of harming others. There was no agitation or lability noted. Speech was of normal rate, volume, and rhythm.  There was no evidence of any dave or hypomania or any symptoms of psychosis, such as hallucinations, delusional thinking, etc. Thought process was linear and organized. Cognitively, the patient showed no signs of any deficits in memory, word-finding, processing speed, or executive functioning. Judgment and insight are noted to be poor. DIAGNOSTIC IMPRESSION: Mood disorder NOS, PTSD by hx, r/o malingering       COURSE IN THE HOSPITAL:  Sher Guadalupe was admitted to the inpatient psychiatry unit at [facility] for acute psychiatric stabilization in regards to symptomatology as described in the HPI above and placed on Q15 minute checks and withdrawal precautions. While on the unit, Sher Guadalupe was involved in individual, group, occupational and milieu therapy. Pt was started back on psychiatric medications to address symptomatology described above. Pt improved gradually and was able to integrate into the milieu with help from the nursing staff. Sher Guadalupe has made progress over the course of hospitalization and is psychiatrically stable for discharge at this time. Pt was appropriate on the unit, interacted and engaged appropriately with peers and staff, and was cooperative with medications and participated in the unit routine. Please see individual progress notes for more specific details regarding patient's hospitalization course. Patient was discharged as per the plan. Pt  had been doing well on the unit as per the report of the nursing staff and my observations. No PRN medication for agitation, seclusion or restraints were required during the last 48 hours of the stay. Sher Guadalupe has improved progressively to the point of being stable for discharge and outpatient FU. At this time pt did not offer any complaints. Patient denied any SI or HI. Denied any AH or VH. Pt engaged in safety planning and agrees to enact safety plan should any thoughts of harming self or others arise. Patient is future oriented, identifies reasons for living and goals for the future, and is not felt to be an immediate risk to self or others. Patient will follow-up with appointments and remains motivated to be in treatment. The patient verbalized understanding of the above discharge instructions. DISCHARGE DIAGNOSIS: Mood disorder NOS, PTSD by hx, r/o malingering    Current Discharge Medication List        START taking these medications    Details   lidocaine 4 % patch Apply patch to back . Apply patch to the affected area for 12 hours a day and remove for 12 hours a day. Indications: pain  Qty: 60 Patch, Refills: 0  Start date: 3/15/2023      gabapentin (NEURONTIN) 300 mg capsule Take 1 Capsule by mouth three (3) times daily. Max Daily Amount: 900 mg. Indications: neuropathic pain  Qty: 90 Capsule, Refills: 0  Start date: 3/15/2023    Associated Diagnoses: Acute low back pain without sciatica, unspecified back pain laterality; Chronic pain syndrome; Fibromyalgia           CONTINUE these medications which have CHANGED    Details   DULoxetine (CYMBALTA) 60 mg capsule Take 1 Capsule by mouth two (2) times a day.  Indications: anxiousness associated with depression  Qty: 60 Capsule, Refills: 0  Start date: 3/15/2023           STOP taking these medications       lidocaine (LIDODERM) 5 % Comments:   Reason for Stopping:         methocarbamoL (ROBAXIN) 750 mg tablet Comments:   Reason for Stopping:         LORazepam (ATIVAN) 1 mg tablet Comments:   Reason for Stopping:         HYDROcodone-acetaminophen (NORCO) 5-325 mg per tablet Comments:   Reason for Stopping:         valsartan-hydroCHLOROthiazide (DIOVAN-HCT) 160-12.5 mg per tablet Comments:   Reason for Stopping:         albuterol (Proventil HFA) 90 mcg/actuation inhaler Comments:   Reason for Stopping:              No results found for: VALF2, VALAC, VALP, VALPR, DS6, CRBAM, CRBAMP, CARB2, XCRBAM  No results found for: LITHM    Follow-up Information       Follow up With Specialties Details Why [address],  Mason Stephens NP Family Medicine   [address] [place] [id number]  [phone]      [facility] EMERGENCY DEP Emergency Medicine  If symptoms worsen [address]   [phone]    [facility]  Go today Go to today for community stabilization services and then continue with mental health skill building services [facility] Soo Carter, [address]    Phone: [phone]  Fax: [phone]          WOUND CARE: none needed. MENTAL STATUS EXAM:  Yuliana Smith is a 49 y.o. obese female who is in fair grooming, in casual attire  Psychomotor activity is WNL  Speech is spontaneous, normal rate, volume, rhythm  Mood is \"OK\"  Affect: guarded  Perception: no AVH, no evidence of psychotic processes  Thought process: concrete  Thought content: Denies SI/plan/intent, denies HI/plan/intent  Insight/judgment: fair  Cognition is grossly intact    PROGNOSIS:   Good / Benedetta Gregorio based on nature of patient's pathology/ies and treatment compliance issues. Prognosis is greatly dependent upon patient's ability to  follow up on psychiatric/psychotherapy appointments as well as to comply with psychiatric medications as prescribed.

## 2023-03-15 NOTE — PROGRESS NOTES
Pharmacist Discharge Medication Reconciliation    Discharging Provider: Dr. Sheldon Elam PMH:   Past Medical History:   Diagnosis Date    Asthma     Chronic pain     Depression     Fibromyalgia     Hypertension      Chief Complaint for this Admission:   Chief Complaint   Patient presents with    Back Pain     Allergies: Aspirin, Ibuprofen, Lisinopril, Nsaids (non-steroidal anti-inflammatory drug), and Tramadol    Discharge Medications:   Current Discharge Medication List        START taking these medications    Details   lidocaine 4 % patch Apply patch to back . Apply patch to the affected area for 12 hours a day and remove for 12 hours a day. Indications: pain  Qty: 60 Patch, Refills: 0  Start date: 3/15/2023      gabapentin (NEURONTIN) 300 mg capsule Take 1 Capsule by mouth three (3) times daily. Max Daily Amount: 900 mg. Indications: neuropathic pain  Qty: 90 Capsule, Refills: 0  Start date: 3/15/2023    Associated Diagnoses: Acute low back pain without sciatica, unspecified back pain laterality; Chronic pain syndrome; Fibromyalgia           CONTINUE these medications which have CHANGED    Details   DULoxetine (CYMBALTA) 60 mg capsule Take 1 Capsule by mouth two (2) times a day.  Indications: anxiousness associated with depression  Qty: 60 Capsule, Refills: 0  Start date: 3/15/2023           STOP taking these medications       lidocaine (LIDODERM) 5 % Comments:   Reason for Stopping:         methocarbamoL (ROBAXIN) 750 mg tablet Comments:   Reason for Stopping:         LORazepam (ATIVAN) 1 mg tablet Comments:   Reason for Stopping:         HYDROcodone-acetaminophen (NORCO) 5-325 mg per tablet Comments:   Reason for Stopping:         ondansetron (ZOFRAN ODT) 4 mg disintegrating tablet Comments:   Reason for Stopping:         valsartan-hydroCHLOROthiazide (DIOVAN-HCT) 160-12.5 mg per tablet Comments:   Reason for Stopping:         albuterol (Proventil HFA) 90 mcg/actuation inhaler Comments:   Reason for Stopping:             The patient's chart, MAR and AVS were reviewed by Larry Dunn PHARMD.

## 2023-03-15 NOTE — PROGRESS NOTES
Problem: Falls - Risk of  Goal: *Absence of Falls  Description: Document Ladona Camille Fall Risk and appropriate interventions in the flowsheet. Outcome: Progressing Towards Goal  Note: Fall Risk Interventions: Teach Pt to arise slowly from seated position          Pt received resting in bed with eyes closed. Breathing is even and unlabored. Walkways are free and clear from clutter.

## 2023-03-15 NOTE — BH NOTES
GROUP THERAPY PROGRESS NOTE    Patient did not participate in self-care group.     Lucretia COTTO, [facility]

## 2023-03-17 DIAGNOSIS — R11.0 NAUSEA: ICD-10-CM

## 2023-03-20 RX ORDER — ONDANSETRON 4 MG/1
TABLET, ORALLY DISINTEGRATING ORAL
Qty: 15 TABLET | Refills: 0 | Status: SHIPPED | OUTPATIENT
Start: 2023-03-20

## 2023-03-29 ENCOUNTER — VIRTUAL VISIT (OUTPATIENT)
Dept: FAMILY MEDICINE CLINIC | Age: 48
End: 2023-03-29
Payer: MEDICAID

## 2023-03-29 DIAGNOSIS — F41.9 ANXIETY: ICD-10-CM

## 2023-03-29 DIAGNOSIS — M54.42 ACUTE BILATERAL LOW BACK PAIN WITH BILATERAL SCIATICA: Primary | ICD-10-CM

## 2023-03-29 DIAGNOSIS — M54.41 ACUTE BILATERAL LOW BACK PAIN WITH BILATERAL SCIATICA: Primary | ICD-10-CM

## 2023-03-29 DIAGNOSIS — F33.1 MODERATE EPISODE OF RECURRENT MAJOR DEPRESSIVE DISORDER (HCC): ICD-10-CM

## 2023-03-29 PROCEDURE — 99213 OFFICE O/P EST LOW 20 MIN: CPT | Performed by: NURSE PRACTITIONER

## 2023-03-29 RX ORDER — LORAZEPAM 1 MG/1
TABLET ORAL
COMMUNITY
Start: 2023-03-27

## 2023-03-29 RX ORDER — HYDROCHLOROTHIAZIDE 12.5 MG/1
TABLET ORAL
COMMUNITY
Start: 2023-03-21

## 2023-03-29 RX ORDER — TRAZODONE HYDROCHLORIDE 50 MG/1
TABLET ORAL
COMMUNITY
Start: 2023-03-21 | End: 2023-03-29 | Stop reason: SDUPTHER

## 2023-03-29 RX ORDER — HYDROCODONE BITARTRATE AND ACETAMINOPHEN 5; 325 MG/1; MG/1
1 TABLET ORAL
Qty: 30 TABLET | Refills: 0 | Status: SHIPPED | OUTPATIENT
Start: 2023-03-29 | End: 2023-04-28

## 2023-03-29 RX ORDER — TRAZODONE HYDROCHLORIDE 50 MG/1
50 TABLET ORAL
Qty: 30 TABLET | Refills: 5 | Status: SHIPPED | OUTPATIENT
Start: 2023-03-29

## 2023-03-29 RX ORDER — VALSARTAN 160 MG/1
TABLET ORAL
COMMUNITY
Start: 2023-03-21

## 2023-03-29 RX ORDER — HYDROCODONE BITARTRATE AND ACETAMINOPHEN 5; 325 MG/1; MG/1
TABLET ORAL
COMMUNITY
End: 2023-03-29 | Stop reason: SDUPTHER

## 2023-03-29 RX ORDER — CYCLOBENZAPRINE HCL 10 MG
TABLET ORAL
COMMUNITY
End: 2023-03-29 | Stop reason: SDUPTHER

## 2023-03-29 RX ORDER — CYCLOBENZAPRINE HCL 10 MG
10 TABLET ORAL
Qty: 60 TABLET | Refills: 1 | Status: SHIPPED | OUTPATIENT
Start: 2023-03-29

## 2023-03-29 RX ORDER — ALBUTEROL SULFATE 90 UG/1
AEROSOL, METERED RESPIRATORY (INHALATION)
COMMUNITY
Start: 2023-03-21

## 2023-03-29 NOTE — PROGRESS NOTES
Sebastian Mills (: 1975) is a 49 y.o. female, established patient, here for evaluation of the following chief complaint(s):   Medication Refill       ASSESSMENT/PLAN:  Below is the assessment and plan developed based on review of pertinent history, labs, studies, and medications. Diagnoses and all orders for this visit:    1. Acute bilateral low back pain with bilateral sciatica  -     HYDROcodone-acetaminophen (NORCO) 5-325 mg per tablet; Take 1 Tablet by mouth two (2) times daily as needed for Pain for up to 30 days. Max Daily Amount: 2 Tablets. 2. Anxiety    3. Moderate episode of recurrent major depressive disorder (HCC)    Other orders  -     traZODone (DESYREL) 50 mg tablet; Take 1 Tablet by mouth nightly. -     cyclobenzaprine (FLEXERIL) 10 mg tablet; Take 1 Tablet by mouth three (3) times daily as needed for Muscle Spasm(s).      Refills updated for flexeril and norco  Must keep appt with ortho for eval and neuro    Also refilled trazodone for sleep    Follow up prn      SUBJECTIVE/OBJECTIVE:  HPI  Pt being seen for med refill  Meds pended-  has upcoming appt with neuro and ortho for chronic severe low back pain    Pt states that she was seen in [facility]  -pt states she was seen for thoughts of suicide  -pt states that she is feeling better  Needs refill of trazodone, this was the only med changed for psych    Review of Systems   A comprehensive review of system was obtained and negative except findings in the HPI    No data recorded     Physical Exam    [INSTRUCTIONS:  \"[x]\" Indicates a positive item  \"[]\" Indicates a negative item  -- DELETE ALL ITEMS NOT EXAMINED]    Constitutional: [x] Appears well-developed and well-nourished [x] No apparent distress      [] Abnormal -     Mental status: [x] Alert and awake  [x] Oriented to person/place/time [x] Able to follow commands    [] Abnormal -     Eyes:   EOM    [x]  Normal    [] Abnormal -   Sclera  [x]  Normal    [] Abnormal - Discharge [x]  None visible   [] Abnormal -     HENT: [x] Normocephalic, atraumatic  [] Abnormal -   [x] Mouth/Throat: Mucous membranes are moist    External Ears [x] Normal  [] Abnormal -    Neck: [x] No visualized mass [] Abnormal -     Pulmonary/Chest: [x] Respiratory effort normal   [x] No visualized signs of difficulty breathing or respiratory distress        [] Abnormal -      Musculoskeletal:   [x] Normal gait with no signs of ataxia         [x] Normal range of motion of neck        [] Abnormal -     Neurological:        [x] No Facial Asymmetry (Cranial nerve 7 motor function) (limited exam due to video visit)          [x] No gaze palsy        [] Abnormal -          Skin:        [x] No significant exanthematous lesions or discoloration noted on facial skin         [] Abnormal -            Psychiatric:       [x] Normal Affect [] Abnormal -        [x] No Hallucinations    Other pertinent observable physical exam findings:-    On this date 03/29/2023 I have spent 15 minutes reviewing previous notes, test results and face to face (virtual) with the patient discussing the diagnosis and importance of compliance with the treatment plan as well as documenting on the day of the visitDouglas Marlena Sadler, was evaluated through a synchronous (real-time) audio-video encounter. The patient (or guardian if applicable) is aware that this is a billable service, which includes applicable co-pays. This Virtual Visit was conducted with patient's (and/or legal guardian's) consent. The visit was conducted pursuant to the emergency declaration under the [address] [facility], [address] [place] [address] and the [employer] General Act. Patient identification was verified, and a caregiver was present when appropriate.   The patient was located at: Home: [address] [facility]  The provider was located at: Home: [address] used to authenticate this note.   -- lOe Kramer, NP

## 2023-03-29 NOTE — PROGRESS NOTES
Chief Complaint   Patient presents with    Medication Refill     Pt being seen for med refill  Meds pended    Pt states that she was seen in [facility]  -pt states she was seen for thoughts of suicide  -pt states that she is feeling better    1. Have you been to the ER, urgent care clinic since your last visit? Hospitalized since your last visit? yes    2. Have you seen or consulted any other health care providers outside of the [address] [place] [address] since your last visit? Include any pap smears or colon screening.  No    Pt has no other concerns

## 2023-04-26 ENCOUNTER — TELEPHONE (OUTPATIENT)
Dept: FAMILY MEDICINE CLINIC | Age: 48
End: 2023-04-26

## 2023-04-26 DIAGNOSIS — R11.0 NAUSEA: ICD-10-CM

## 2023-04-26 RX ORDER — DULOXETIN HYDROCHLORIDE 60 MG/1
60 CAPSULE, DELAYED RELEASE ORAL 2 TIMES DAILY
Qty: 60 CAPSULE | Refills: 0 | Status: SHIPPED | OUTPATIENT
Start: 2023-04-26

## 2023-04-26 RX ORDER — CYCLOBENZAPRINE HCL 10 MG
10 TABLET ORAL
Qty: 60 TABLET | Refills: 1 | Status: SHIPPED | OUTPATIENT
Start: 2023-04-26

## 2023-04-26 RX ORDER — TRAZODONE HYDROCHLORIDE 50 MG/1
50 TABLET ORAL
Qty: 30 TABLET | Refills: 5 | Status: SHIPPED | OUTPATIENT
Start: 2023-04-26

## 2023-04-26 RX ORDER — ONDANSETRON 4 MG/1
TABLET, ORALLY DISINTEGRATING ORAL
Qty: 15 TABLET | Refills: 0 | Status: SHIPPED | OUTPATIENT
Start: 2023-04-26

## 2023-04-26 NOTE — TELEPHONE ENCOUNTER
Patient has a vv appt on 05/03/23 with [facility] NP.  She needs a refill on cymbalta & ativan sent to [facility]

## 2023-05-03 ENCOUNTER — VIRTUAL VISIT (OUTPATIENT)
Dept: FAMILY MEDICINE CLINIC | Age: 48
End: 2023-05-03
Payer: MEDICAID

## 2023-05-03 DIAGNOSIS — G89.4 CHRONIC PAIN SYNDROME: ICD-10-CM

## 2023-05-03 DIAGNOSIS — F41.9 ANXIETY: ICD-10-CM

## 2023-05-03 DIAGNOSIS — M79.7 FIBROMYALGIA: ICD-10-CM

## 2023-05-03 DIAGNOSIS — M54.50 ACUTE LOW BACK PAIN WITHOUT SCIATICA, UNSPECIFIED BACK PAIN LATERALITY: Primary | ICD-10-CM

## 2023-05-03 PROCEDURE — 99213 OFFICE O/P EST LOW 20 MIN: CPT | Performed by: NURSE PRACTITIONER

## 2023-05-03 RX ORDER — HYDROCODONE BITARTRATE AND ACETAMINOPHEN 5; 325 MG/1; MG/1
1 TABLET ORAL
Qty: 30 TABLET | Refills: 0 | Status: SHIPPED | OUTPATIENT
Start: 2023-05-03 | End: 2023-06-02

## 2023-05-03 RX ORDER — LORAZEPAM 1 MG/1
1 TABLET ORAL
Qty: 45 TABLET | Refills: 2 | Status: SHIPPED | OUTPATIENT
Start: 2023-05-03 | End: 2023-06-02

## 2023-05-03 RX ORDER — HYDROCODONE BITARTRATE AND ACETAMINOPHEN 5; 325 MG/1; MG/1
TABLET ORAL
COMMUNITY
End: 2023-05-03 | Stop reason: SDUPTHER

## 2023-05-03 RX ORDER — GABAPENTIN 300 MG/1
300 CAPSULE ORAL 3 TIMES DAILY
Qty: 90 CAPSULE | Refills: 5 | Status: SHIPPED | OUTPATIENT
Start: 2023-05-03

## 2023-05-09 LAB — HBA1C MFR BLD HPLC: 5.5 %

## 2023-05-11 RX ORDER — HYDROCHLOROTHIAZIDE 12.5 MG/1
TABLET ORAL
COMMUNITY
Start: 2023-03-21

## 2023-05-11 RX ORDER — DULOXETIN HYDROCHLORIDE 60 MG/1
60 CAPSULE, DELAYED RELEASE ORAL 2 TIMES DAILY
COMMUNITY
Start: 2023-04-26 | End: 2023-06-26 | Stop reason: SDUPTHER

## 2023-05-11 RX ORDER — GABAPENTIN 300 MG/1
300 CAPSULE ORAL 3 TIMES DAILY
COMMUNITY
Start: 2023-05-03

## 2023-05-11 RX ORDER — ALBUTEROL SULFATE 90 UG/1
AEROSOL, METERED RESPIRATORY (INHALATION)
COMMUNITY
Start: 2023-03-21 | End: 2023-07-03

## 2023-05-11 RX ORDER — HYDROCODONE BITARTRATE AND ACETAMINOPHEN 5; 325 MG/1; MG/1
TABLET ORAL
COMMUNITY
Start: 2023-05-03 | End: 2023-05-31 | Stop reason: SDUPTHER

## 2023-05-11 RX ORDER — LIDOCAINE 50 MG/G
PATCH TOPICAL
COMMUNITY
Start: 2023-03-21 | End: 2023-06-26 | Stop reason: SDUPTHER

## 2023-05-11 RX ORDER — CYCLOBENZAPRINE HCL 10 MG
10 TABLET ORAL 3 TIMES DAILY PRN
COMMUNITY
Start: 2023-04-26 | End: 2023-05-23

## 2023-05-11 RX ORDER — TRAZODONE HYDROCHLORIDE 50 MG/1
50 TABLET ORAL NIGHTLY
COMMUNITY
Start: 2023-04-26

## 2023-05-11 RX ORDER — LOSARTAN POTASSIUM AND HYDROCHLOROTHIAZIDE 25; 100 MG/1; MG/1
1 TABLET ORAL DAILY
COMMUNITY
Start: 2021-01-17

## 2023-05-11 RX ORDER — LORAZEPAM 1 MG/1
1 TABLET ORAL 2 TIMES DAILY PRN
COMMUNITY
Start: 2023-05-03

## 2023-05-11 RX ORDER — ONDANSETRON 4 MG/1
TABLET, ORALLY DISINTEGRATING ORAL
COMMUNITY
Start: 2023-04-26 | End: 2023-06-26

## 2023-05-23 ENCOUNTER — TELEPHONE (OUTPATIENT)
Age: 48
End: 2023-05-23

## 2023-05-23 ENCOUNTER — TELEMEDICINE (OUTPATIENT)
Age: 48
End: 2023-05-23
Payer: MEDICAID

## 2023-05-23 DIAGNOSIS — M54.41 CHRONIC BILATERAL LOW BACK PAIN WITH BILATERAL SCIATICA: Primary | ICD-10-CM

## 2023-05-23 DIAGNOSIS — G89.29 CHRONIC BILATERAL LOW BACK PAIN WITH BILATERAL SCIATICA: Primary | ICD-10-CM

## 2023-05-23 DIAGNOSIS — M54.42 CHRONIC BILATERAL LOW BACK PAIN WITH BILATERAL SCIATICA: Primary | ICD-10-CM

## 2023-05-23 PROCEDURE — G8427 DOCREV CUR MEDS BY ELIG CLIN: HCPCS | Performed by: NURSE PRACTITIONER

## 2023-05-23 PROCEDURE — 99213 OFFICE O/P EST LOW 20 MIN: CPT | Performed by: NURSE PRACTITIONER

## 2023-05-23 RX ORDER — MELOXICAM 7.5 MG/1
7.5 TABLET ORAL 2 TIMES DAILY
Qty: 60 TABLET | Refills: 1 | Status: SHIPPED | OUTPATIENT
Start: 2023-05-23

## 2023-05-23 RX ORDER — METHOCARBAMOL 500 MG/1
500 TABLET, FILM COATED ORAL 3 TIMES DAILY PRN
Qty: 60 TABLET | Refills: 1 | Status: SHIPPED | OUTPATIENT
Start: 2023-05-23

## 2023-05-23 ASSESSMENT — PATIENT HEALTH QUESTIONNAIRE - PHQ9: DEPRESSION UNABLE TO ASSESS: FUNCTIONAL CAPACITY MOTIVATION LIMITS ACCURACY

## 2023-05-23 NOTE — PROGRESS NOTES
Chief Complaint   Patient presents with    Pain     Arthritis an bone spurns      Pt being seen for pain from the ER  -[facility] and [facility]     1. Have you been to the ER, urgent care clinic since your last visit? Hospitalized since your last visit? [facility]     2. Have you seen or consulted any other health care providers outside of the [address] [place] [address] since your last visit? Include any pap smears or colon screening.  No    Pt has no other concerns

## 2023-05-24 NOTE — PROGRESS NOTES
Alex Amin (:  1975) is a Established patient, presenting virtually for evaluation of the following:    Assessment & Plan   Below is the assessment and plan developed based on review of pertinent history, physical exam, labs, studies, and medications. Jeffry Stewart was seen today for pain. Diagnoses and all orders for this visit:    Chronic bilateral low back pain with bilateral sciatica    Other orders  -     methocarbamol (ROBAXIN) 500 MG tablet; Take 1 tablet by mouth 3 times daily as needed (muscle spasms)  -     meloxicam (MOBIC) 7.5 MG tablet;  Take 1 tablet by mouth in the morning and at bedtime      Given robaxin and mobic for spasms and arthritis of the back and knees  Follow up prn    Subjective   HPI     Pt being seen for pain from the ER  -[facility] was [facility] for bone sp[facility] and arthritis   She is already managed on chronic pain meds but not helping    Review of Systems   A comprehensive review of system was obtained and negative except findings in the HPI      Objective   Patient-Reported Vitals  No data recorded     Physical Exam  [INSTRUCTIONS:  \"[x]\" Indicates a positive item  \"[]\" Indicates a negative item  -- DELETE ALL ITEMS NOT EXAMINED]    Constitutional: [x] Appears well-developed and well-nourished [x] No apparent distress      [] Abnormal -     Mental status: [x] Alert and awake  [x] Oriented to person/place/time [x] Able to follow commands    [] Abnormal -     Eyes:   EOM    [x]  Normal    [] Abnormal -   Sclera  [x]  Normal    [] Abnormal -          Discharge [x]  None visible   [] Abnormal -     HENT: [x] Normocephalic, atraumatic  [] Abnormal -   [x] Mouth/Throat: Mucous membranes are moist    External Ears [x] Normal  [] Abnormal -    Neck: [x] No visualized mass [] Abnormal -     Pulmonary/Chest: [x] Respiratory effort normal   [x] No visualized signs of difficulty breathing or respiratory distress        [] Abnormal -      Musculoskeletal:   [x] Normal gait with no signs of

## 2023-05-31 DIAGNOSIS — M54.42 CHRONIC BILATERAL LOW BACK PAIN WITH BILATERAL SCIATICA: Primary | ICD-10-CM

## 2023-05-31 DIAGNOSIS — G89.29 CHRONIC BILATERAL LOW BACK PAIN WITH BILATERAL SCIATICA: Primary | ICD-10-CM

## 2023-05-31 DIAGNOSIS — M54.41 CHRONIC BILATERAL LOW BACK PAIN WITH BILATERAL SCIATICA: Primary | ICD-10-CM

## 2023-05-31 RX ORDER — HYDROCODONE BITARTRATE AND ACETAMINOPHEN 5; 325 MG/1; MG/1
1 TABLET ORAL 2 TIMES DAILY
Qty: 60 TABLET | Refills: 0 | Status: SHIPPED | OUTPATIENT
Start: 2023-05-31 | End: 2023-06-30

## 2023-06-20 RX ORDER — METHOCARBAMOL 500 MG/1
500 TABLET, FILM COATED ORAL 3 TIMES DAILY PRN
Qty: 60 TABLET | Refills: 1 | Status: SHIPPED | OUTPATIENT
Start: 2023-06-20

## 2023-06-26 RX ORDER — LIDOCAINE 50 MG/G
1 PATCH TOPICAL EVERY 24 HOURS
Qty: 30 PATCH | Refills: 3 | Status: SHIPPED | OUTPATIENT
Start: 2023-06-26

## 2023-06-26 RX ORDER — ONDANSETRON 4 MG/1
TABLET, ORALLY DISINTEGRATING ORAL
Qty: 15 TABLET | Refills: 1 | Status: SHIPPED | OUTPATIENT
Start: 2023-06-26

## 2023-06-26 RX ORDER — ONDANSETRON 4 MG/1
TABLET, ORALLY DISINTEGRATING ORAL
Qty: 15 TABLET | Refills: 1 | Status: SHIPPED | OUTPATIENT
Start: 2023-06-26 | End: 2023-06-26 | Stop reason: SDUPTHER

## 2023-06-26 RX ORDER — DULOXETIN HYDROCHLORIDE 60 MG/1
60 CAPSULE, DELAYED RELEASE ORAL 2 TIMES DAILY
Qty: 60 CAPSULE | Refills: 2 | Status: SHIPPED | OUTPATIENT
Start: 2023-06-26

## 2023-06-28 ENCOUNTER — TELEPHONE (OUTPATIENT)
Age: 48
End: 2023-06-28

## 2023-06-29 DIAGNOSIS — G89.29 CHRONIC BILATERAL LOW BACK PAIN WITH BILATERAL SCIATICA: ICD-10-CM

## 2023-06-29 DIAGNOSIS — M54.41 CHRONIC BILATERAL LOW BACK PAIN WITH BILATERAL SCIATICA: ICD-10-CM

## 2023-06-29 DIAGNOSIS — M54.42 CHRONIC BILATERAL LOW BACK PAIN WITH BILATERAL SCIATICA: ICD-10-CM

## 2023-06-30 RX ORDER — CALCIUM CARBONATE 160(400)MG
TABLET,CHEWABLE ORAL
Qty: 1 EACH | Refills: 0 | Status: SHIPPED | OUTPATIENT
Start: 2023-06-30

## 2023-06-30 RX ORDER — HYDROCODONE BITARTRATE AND ACETAMINOPHEN 5; 325 MG/1; MG/1
1 TABLET ORAL 2 TIMES DAILY
Qty: 60 TABLET | Refills: 0 | Status: SHIPPED | OUTPATIENT
Start: 2023-06-30 | End: 2023-07-30

## 2023-07-03 RX ORDER — ALBUTEROL SULFATE 90 UG/1
AEROSOL, METERED RESPIRATORY (INHALATION)
Qty: 18 G | Refills: 0 | Status: SHIPPED | OUTPATIENT
Start: 2023-07-03

## 2023-07-12 ENCOUNTER — TELEMEDICINE (OUTPATIENT)
Age: 48
End: 2023-07-12
Payer: MEDICAID

## 2023-07-12 DIAGNOSIS — F41.9 ANXIETY AND DEPRESSION: ICD-10-CM

## 2023-07-12 DIAGNOSIS — G89.29 CHRONIC BILATERAL LOW BACK PAIN WITH BILATERAL SCIATICA: Primary | ICD-10-CM

## 2023-07-12 DIAGNOSIS — M54.42 CHRONIC BILATERAL LOW BACK PAIN WITH BILATERAL SCIATICA: Primary | ICD-10-CM

## 2023-07-12 DIAGNOSIS — F32.A ANXIETY AND DEPRESSION: ICD-10-CM

## 2023-07-12 DIAGNOSIS — M54.41 CHRONIC BILATERAL LOW BACK PAIN WITH BILATERAL SCIATICA: Primary | ICD-10-CM

## 2023-07-12 PROCEDURE — 99214 OFFICE O/P EST MOD 30 MIN: CPT | Performed by: NURSE PRACTITIONER

## 2023-07-12 RX ORDER — BUPROPION HYDROCHLORIDE 150 MG/1
150 TABLET ORAL EVERY MORNING
Qty: 30 TABLET | Refills: 3 | Status: SHIPPED | OUTPATIENT
Start: 2023-07-12

## 2023-07-12 RX ORDER — LOPERAMIDE HYDROCHLORIDE 2 MG/1
2 CAPSULE ORAL 4 TIMES DAILY PRN
Qty: 30 CAPSULE | Refills: 1 | Status: SHIPPED | OUTPATIENT
Start: 2023-07-12

## 2023-07-12 RX ORDER — METHOCARBAMOL 500 MG/1
500 TABLET, FILM COATED ORAL 3 TIMES DAILY PRN
Qty: 60 TABLET | Refills: 1 | Status: SHIPPED | OUTPATIENT
Start: 2023-07-12

## 2023-07-12 SDOH — ECONOMIC STABILITY: HOUSING INSECURITY
IN THE LAST 12 MONTHS, WAS THERE A TIME WHEN YOU DID NOT HAVE A STEADY PLACE TO SLEEP OR SLEPT IN A SHELTER (INCLUDING NOW)?: YES

## 2023-07-12 SDOH — ECONOMIC STABILITY: FOOD INSECURITY: WITHIN THE PAST 12 MONTHS, YOU WORRIED THAT YOUR FOOD WOULD RUN OUT BEFORE YOU GOT MONEY TO BUY MORE.: OFTEN TRUE

## 2023-07-12 SDOH — ECONOMIC STABILITY: TRANSPORTATION INSECURITY
IN THE PAST 12 MONTHS, HAS LACK OF TRANSPORTATION KEPT YOU FROM MEETINGS, WORK, OR FROM GETTING THINGS NEEDED FOR DAILY LIVING?: YES

## 2023-07-12 SDOH — ECONOMIC STABILITY: INCOME INSECURITY: HOW HARD IS IT FOR YOU TO PAY FOR THE VERY BASICS LIKE FOOD, HOUSING, MEDICAL CARE, AND HEATING?: VERY HARD

## 2023-07-12 SDOH — ECONOMIC STABILITY: FOOD INSECURITY: WITHIN THE PAST 12 MONTHS, THE FOOD YOU BOUGHT JUST DIDN'T LAST AND YOU DIDN'T HAVE MONEY TO GET MORE.: OFTEN TRUE

## 2023-07-12 ASSESSMENT — ANXIETY QUESTIONNAIRES
7. FEELING AFRAID AS IF SOMETHING AWFUL MIGHT HAPPEN: 0
4. TROUBLE RELAXING: 3
GAD7 TOTAL SCORE: 13
3. WORRYING TOO MUCH ABOUT DIFFERENT THINGS: 3
2. NOT BEING ABLE TO STOP OR CONTROL WORRYING: 3
1. FEELING NERVOUS, ANXIOUS, OR ON EDGE: 3
IF YOU CHECKED OFF ANY PROBLEMS ON THIS QUESTIONNAIRE, HOW DIFFICULT HAVE THESE PROBLEMS MADE IT FOR YOU TO DO YOUR WORK, TAKE CARE OF THINGS AT HOME, OR GET ALONG WITH OTHER PEOPLE: VERY DIFFICULT
5. BEING SO RESTLESS THAT IT IS HARD TO SIT STILL: 0
6. BECOMING EASILY ANNOYED OR IRRITABLE: 1

## 2023-07-12 ASSESSMENT — PATIENT HEALTH QUESTIONNAIRE - PHQ9
8. MOVING OR SPEAKING SO SLOWLY THAT OTHER PEOPLE COULD HAVE NOTICED. OR THE OPPOSITE, BEING SO FIGETY OR RESTLESS THAT YOU HAVE BEEN MOVING AROUND A LOT MORE THAN USUAL: 0
4. FEELING TIRED OR HAVING LITTLE ENERGY: 3
7. TROUBLE CONCENTRATING ON THINGS, SUCH AS READING THE NEWSPAPER OR WATCHING TELEVISION: 3
SUM OF ALL RESPONSES TO PHQ QUESTIONS 1-9: 18
SUM OF ALL RESPONSES TO PHQ9 QUESTIONS 1 & 2: 6
SUM OF ALL RESPONSES TO PHQ QUESTIONS 1-9: 19
9. THOUGHTS THAT YOU WOULD BE BETTER OFF DEAD, OR OF HURTING YOURSELF: 1
SUM OF ALL RESPONSES TO PHQ QUESTIONS 1-9: 19
5. POOR APPETITE OR OVEREATING: 0
10. IF YOU CHECKED OFF ANY PROBLEMS, HOW DIFFICULT HAVE THESE PROBLEMS MADE IT FOR YOU TO DO YOUR WORK, TAKE CARE OF THINGS AT HOME, OR GET ALONG WITH OTHER PEOPLE: 3
2. FEELING DOWN, DEPRESSED OR HOPELESS: 3
1. LITTLE INTEREST OR PLEASURE IN DOING THINGS: 3
3. TROUBLE FALLING OR STAYING ASLEEP: 3
SUM OF ALL RESPONSES TO PHQ QUESTIONS 1-9: 19
6. FEELING BAD ABOUT YOURSELF - OR THAT YOU ARE A FAILURE OR HAVE LET YOURSELF OR YOUR FAMILY DOWN: 3

## 2023-07-12 ASSESSMENT — COLUMBIA-SUICIDE SEVERITY RATING SCALE - C-SSRS
7. DID THIS OCCUR IN THE LAST THREE MONTHS: NO
6. HAVE YOU EVER DONE ANYTHING, STARTED TO DO ANYTHING, OR PREPARED TO DO ANYTHING TO END YOUR LIFE?: YES
2. HAVE YOU ACTUALLY HAD ANY THOUGHTS OF KILLING YOURSELF?: NO
1. WITHIN THE PAST MONTH, HAVE YOU WISHED YOU WERE DEAD OR WISHED YOU COULD GO TO SLEEP AND NOT WAKE UP?: YES

## 2023-07-12 NOTE — PROGRESS NOTES
Chief Complaint   Patient presents with    Follow-up     Pt being seen for fuv  -pt states she wants to discuss getting imodium     Depression: At risk    PHQ-2 Score: 19     Please review depression screening    1. Have you been to the ER, urgent care clinic since your last visit? Hospitalized since your last visit? No    2. Have you seen or consulted any other health care providers outside of the [address] [place] [address] since your last visit? Include any pap smears or colon screening. No    Social Determinants of Health with Concerns     Alcohol Use: Not on file   Financial Resource Strain: High Risk    Difficulty of Paying Living Expenses: Very hard   Food Insecurity: Food Insecurity Present    Worried About [place] in the Last Year: Often true    Ran Out of Food in the Last Year: Often true   Transportation Needs: Unmet Transportation Needs    Lack of Transportation (Medical): Not on file    Lack of Transportation (Non-Medical): Yes   Physical Activity: Not on file   Stress: Not on file   Social Connections: Not on file   Intimate Partner Violence: Not on file   Depression:  At risk    PHQ-2 Score: 19   Housing Stability: High Risk    Unable to Pay for Housing in the Last Year: Not on file    Number of Places Lived in the Last Year: Not on file    Unstable Housing in the Last Year: Yes     Pt has no other concerns

## 2023-07-12 NOTE — PROGRESS NOTES
Srinivasa Taylor (:  1975) is a Established patient, presenting virtually for evaluation of the following:  Nell Alvares was seen today for follow-up. Diagnoses and all orders for this visit:    Chronic bilateral low back pain with bilateral sciatica    Anxiety and depression    Other orders  -     loperamide (IMODIUM) 2 MG capsule; Take 1 capsule by mouth 4 times daily as needed for Diarrhea  -     buPROPion (WELLBUTRIN XL) 150 MG extended release tablet;  Take 1 tablet by mouth every morning      Add wellbutrin for depression to take with the cymbalta  Also given imodium for the IBS component of her mood dx  Reviewed adr/se of med and what to expect  Recheck 3 weeks for progress       Subjective   HPI  She is having increase in depression and anxiety  Already on cymbalta but not helping  Depressed over being homeless in shelter and denied SSD  No thoughts of hurting self  Also having several bm a day due to stress and food she is not used to eating    Review of Systems   A comprehensive review of system was obtained and negative except findings in the HPI      Objective   Patient-Reported Vitals  No data recorded     Physical Exam  [INSTRUCTIONS:  \"[x]\" Indicates a positive item  \"[]\" Indicates a negative item  -- DELETE ALL ITEMS NOT EXAMINED]    Constitutional: [x] Appears well-developed and well-nourished [x] No apparent distress      [] Abnormal -     Mental status: [x] Alert and awake  [x] Oriented to person/place/time [x] Able to follow commands    [] Abnormal -     Eyes:   EOM    [x]  Normal    [] Abnormal -   Sclera  [x]  Normal    [] Abnormal -          Discharge [x]  None visible   [] Abnormal -     HENT: [x] Normocephalic, atraumatic  [] Abnormal -   [x] Mouth/Throat: Mucous membranes are moist    External Ears [x] Normal  [] Abnormal -    Neck: [x] No visualized mass [] Abnormal -     Pulmonary/Chest: [x] Respiratory effort normal   [x] No visualized signs of difficulty breathing or respiratory

## 2023-07-18 ENCOUNTER — CLINICAL DOCUMENTATION (OUTPATIENT)
Age: 48
End: 2023-07-18

## 2023-07-18 RX ORDER — PREDNISONE 10 MG/1
TABLET ORAL
Qty: 1 EACH | Refills: 0 | Status: SHIPPED | OUTPATIENT
Start: 2023-07-18

## 2023-07-18 RX ORDER — ONDANSETRON 4 MG/1
TABLET, ORALLY DISINTEGRATING ORAL
Qty: 15 TABLET | Refills: 1 | Status: SHIPPED | OUTPATIENT
Start: 2023-07-18

## 2023-07-26 DIAGNOSIS — M54.42 CHRONIC BILATERAL LOW BACK PAIN WITH BILATERAL SCIATICA: ICD-10-CM

## 2023-07-26 DIAGNOSIS — M54.41 CHRONIC BILATERAL LOW BACK PAIN WITH BILATERAL SCIATICA: ICD-10-CM

## 2023-07-26 DIAGNOSIS — G89.29 CHRONIC BILATERAL LOW BACK PAIN WITH BILATERAL SCIATICA: ICD-10-CM

## 2023-07-26 RX ORDER — HYDROCODONE BITARTRATE AND ACETAMINOPHEN 5; 325 MG/1; MG/1
1 TABLET ORAL 2 TIMES DAILY
Qty: 60 TABLET | Refills: 0 | OUTPATIENT
Start: 2023-07-26 | End: 2023-08-25

## 2023-07-28 DIAGNOSIS — F41.9 ANXIETY AND DEPRESSION: Primary | ICD-10-CM

## 2023-07-28 DIAGNOSIS — M54.42 CHRONIC BILATERAL LOW BACK PAIN WITH BILATERAL SCIATICA: ICD-10-CM

## 2023-07-28 DIAGNOSIS — M54.41 CHRONIC BILATERAL LOW BACK PAIN WITH BILATERAL SCIATICA: ICD-10-CM

## 2023-07-28 DIAGNOSIS — G89.29 CHRONIC BILATERAL LOW BACK PAIN WITH BILATERAL SCIATICA: ICD-10-CM

## 2023-07-28 DIAGNOSIS — F32.A ANXIETY AND DEPRESSION: Primary | ICD-10-CM

## 2023-07-28 RX ORDER — HYDROCODONE BITARTRATE AND ACETAMINOPHEN 5; 325 MG/1; MG/1
1 TABLET ORAL 2 TIMES DAILY
Qty: 60 TABLET | Refills: 0 | Status: SHIPPED | OUTPATIENT
Start: 2023-07-28 | End: 2023-08-27

## 2023-07-30 RX ORDER — LORAZEPAM 1 MG/1
TABLET ORAL
Qty: 45 TABLET | Refills: 0 | Status: SHIPPED | OUTPATIENT
Start: 2023-07-30 | End: 2023-08-29

## 2023-08-01 ENCOUNTER — CLINICAL DOCUMENTATION (OUTPATIENT)
Age: 48
End: 2023-08-01

## 2023-08-03 ENCOUNTER — TELEPHONE (OUTPATIENT)
Age: 48
End: 2023-08-03

## 2023-08-03 NOTE — TELEPHONE ENCOUNTER
Spoke with pt, she states that since she changed insurances there is another form that has to be filled out. She states that she will get them to send it [employer]. Informed that once we receive we will review and see if this is something that we can fill out of is an apt is required.  Pt verbalized understanding

## 2023-08-03 NOTE — TELEPHONE ENCOUNTER
----- Message from Mihir Petit LPN sent at 1/1/9046  7:16 AM EDT -----  Regarding: FW: Rahul   Contact: [phone]    ----- Message -----  From: Gretchen Ahumada"  Sent: 8/2/2023   8:50 PM EDT  To: Renan Hunt Clinical Staff  Subject: Andrew Mckinnon at [facility] [employer] [place] has faxed the form twice and I confirmed the fax number with her.

## 2023-08-11 RX ORDER — ONDANSETRON 4 MG/1
TABLET, ORALLY DISINTEGRATING ORAL
Qty: 15 TABLET | Refills: 1 | Status: SHIPPED | OUTPATIENT
Start: 2023-08-11

## 2023-08-11 RX ORDER — METHOCARBAMOL 500 MG/1
500 TABLET, FILM COATED ORAL 3 TIMES DAILY PRN
Qty: 60 TABLET | Refills: 1 | Status: SHIPPED | OUTPATIENT
Start: 2023-08-11

## 2023-08-11 RX ORDER — LIDOCAINE 50 MG/G
1 PATCH TOPICAL EVERY 24 HOURS
Qty: 30 PATCH | Refills: 3 | Status: SHIPPED | OUTPATIENT
Start: 2023-08-11

## 2023-08-16 ENCOUNTER — TELEMEDICINE (OUTPATIENT)
Age: 48
End: 2023-08-16
Payer: MEDICAID

## 2023-08-16 DIAGNOSIS — F41.9 ANXIETY AND DEPRESSION: Primary | ICD-10-CM

## 2023-08-16 DIAGNOSIS — F32.A ANXIETY AND DEPRESSION: Primary | ICD-10-CM

## 2023-08-16 PROCEDURE — 99213 OFFICE O/P EST LOW 20 MIN: CPT | Performed by: NURSE PRACTITIONER

## 2023-08-16 NOTE — PROGRESS NOTES
Lata Jansen (:  1975) is a Established patient, presenting virtually for evaluation of the following:    Assessment & Plan   Below is the assessment and plan developed based on review of pertinent history, physical exam, labs, studies, and medications. 1. Anxiety and depression    No changes in wellbutrin xl 150  Follow up prn    No follow-ups on file.        Subjective   HPI  Started wellbutrin xl 150mg one a day last visit for anxiety and depression  Mood seems to be much better  No adr/se of med noted  Happy with results    Review of Systems   A comprehensive review of system was obtained and negative except findings in the HPI      Objective   Patient-Reported Vitals  No data recorded     Physical Exam  [INSTRUCTIONS:  \"[x]\" Indicates a positive item  \"[]\" Indicates a negative item  -- DELETE ALL ITEMS NOT EXAMINED]    Constitutional: [x] Appears well-developed and well-nourished [x] No apparent distress      [] Abnormal -     Mental status: [x] Alert and awake  [x] Oriented to person/place/time [x] Able to follow commands    [] Abnormal -     Eyes:   EOM    [x]  Normal    [] Abnormal -   Sclera  [x]  Normal    [] Abnormal -          Discharge [x]  None visible   [] Abnormal -     HENT: [x] Normocephalic, atraumatic  [] Abnormal -   [x] Mouth/Throat: Mucous membranes are moist    External Ears [x] Normal  [] Abnormal -    Neck: [x] No visualized mass [] Abnormal -     Pulmonary/Chest: [x] Respiratory effort normal   [x] No visualized signs of difficulty breathing or respiratory distress        [] Abnormal -      Musculoskeletal:   [x] Normal gait with no signs of ataxia         [x] Normal range of motion of neck        [] Abnormal -     Neurological:        [x] No Facial Asymmetry (Cranial nerve 7 motor function) (limited exam due to video visit)          [x] No gaze palsy        [] Abnormal -          Skin:        [x] No significant exanthematous lesions or discoloration noted on facial skin

## 2023-08-17 ENCOUNTER — CLINICAL DOCUMENTATION (OUTPATIENT)
Age: 48
End: 2023-08-17

## 2023-08-24 ENCOUNTER — CLINICAL DOCUMENTATION (OUTPATIENT)
Age: 48
End: 2023-08-24

## 2023-08-24 DIAGNOSIS — M54.42 CHRONIC BILATERAL LOW BACK PAIN WITH BILATERAL SCIATICA: ICD-10-CM

## 2023-08-24 DIAGNOSIS — M54.41 CHRONIC BILATERAL LOW BACK PAIN WITH BILATERAL SCIATICA: ICD-10-CM

## 2023-08-24 DIAGNOSIS — G89.29 CHRONIC BILATERAL LOW BACK PAIN WITH BILATERAL SCIATICA: ICD-10-CM

## 2023-08-24 RX ORDER — ALBUTEROL SULFATE 90 UG/1
2 AEROSOL, METERED RESPIRATORY (INHALATION) EVERY 4 HOURS PRN
Qty: 18 G | Refills: 0 | Status: SHIPPED | OUTPATIENT
Start: 2023-08-24

## 2023-08-24 RX ORDER — HYDROCODONE BITARTRATE AND ACETAMINOPHEN 5; 325 MG/1; MG/1
1 TABLET ORAL 2 TIMES DAILY
Qty: 60 TABLET | Refills: 0 | OUTPATIENT
Start: 2023-08-24 | End: 2023-09-23

## 2023-08-28 DIAGNOSIS — M54.41 CHRONIC BILATERAL LOW BACK PAIN WITH BILATERAL SCIATICA: Primary | ICD-10-CM

## 2023-08-28 DIAGNOSIS — M54.42 CHRONIC BILATERAL LOW BACK PAIN WITH BILATERAL SCIATICA: Primary | ICD-10-CM

## 2023-08-28 DIAGNOSIS — G89.29 CHRONIC BILATERAL LOW BACK PAIN WITH BILATERAL SCIATICA: Primary | ICD-10-CM

## 2023-08-28 RX ORDER — HYDROCODONE BITARTRATE AND ACETAMINOPHEN 5; 325 MG/1; MG/1
1 TABLET ORAL 2 TIMES DAILY PRN
Qty: 60 TABLET | Refills: 0 | Status: SHIPPED | OUTPATIENT
Start: 2023-08-28 | End: 2023-09-27

## 2023-08-29 DIAGNOSIS — F41.9 ANXIETY AND DEPRESSION: ICD-10-CM

## 2023-08-29 DIAGNOSIS — F32.A ANXIETY AND DEPRESSION: ICD-10-CM

## 2023-08-29 RX ORDER — LORAZEPAM 1 MG/1
TABLET ORAL
Qty: 45 TABLET | Refills: 0 | Status: SHIPPED | OUTPATIENT
Start: 2023-08-29 | End: 2023-09-28

## 2023-09-05 ENCOUNTER — CLINICAL DOCUMENTATION (OUTPATIENT)
Age: 48
End: 2023-09-05

## 2023-09-18 ENCOUNTER — CLINICAL DOCUMENTATION (OUTPATIENT)
Age: 48
End: 2023-09-18

## 2023-09-28 ENCOUNTER — TELEPHONE (OUTPATIENT)
Age: 48
End: 2023-09-28

## 2023-09-28 DIAGNOSIS — M54.42 CHRONIC BILATERAL LOW BACK PAIN WITH BILATERAL SCIATICA: Primary | ICD-10-CM

## 2023-09-28 DIAGNOSIS — F32.A ANXIETY AND DEPRESSION: ICD-10-CM

## 2023-09-28 DIAGNOSIS — F41.9 ANXIETY AND DEPRESSION: ICD-10-CM

## 2023-09-28 DIAGNOSIS — M54.41 CHRONIC BILATERAL LOW BACK PAIN WITH BILATERAL SCIATICA: Primary | ICD-10-CM

## 2023-09-28 DIAGNOSIS — G89.29 CHRONIC BILATERAL LOW BACK PAIN WITH BILATERAL SCIATICA: Primary | ICD-10-CM

## 2023-09-28 RX ORDER — HYDROCODONE BITARTRATE AND ACETAMINOPHEN 5; 325 MG/1; MG/1
1 TABLET ORAL 2 TIMES DAILY PRN
Qty: 60 TABLET | Refills: 0 | Status: SHIPPED | OUTPATIENT
Start: 2023-09-28 | End: 2023-10-28

## 2023-09-28 RX ORDER — BUPROPION HYDROCHLORIDE 150 MG/1
150 TABLET ORAL EVERY MORNING
Qty: 30 TABLET | Refills: 3 | Status: SHIPPED | OUTPATIENT
Start: 2023-09-28

## 2023-09-28 RX ORDER — LORAZEPAM 1 MG/1
1 TABLET ORAL 2 TIMES DAILY PRN
Qty: 45 TABLET | Refills: 0 | Status: SHIPPED | OUTPATIENT
Start: 2023-09-28 | End: 2023-10-28

## 2023-09-28 NOTE — TELEPHONE ENCOUNTER
[facility] needs a refill of HYDROcodone-acetaminophen (NORCO) 5-325 mg per tablet. They have 1 pills/supply left and are requesting a 30 day supply with refills. Pharmacy has been updated in the chart. Patient was advised or scheduled an appointment for the future and to request refills thru the [facility] or by requesting a refill from their pharmacy in the future. Patient was also advised to check with their pharmacy for status of when refills are available.

## 2023-10-02 RX ORDER — DULOXETIN HYDROCHLORIDE 60 MG/1
60 CAPSULE, DELAYED RELEASE ORAL 2 TIMES DAILY
Qty: 60 CAPSULE | Refills: 2 | Status: SHIPPED | OUTPATIENT
Start: 2023-10-02

## 2023-10-18 ENCOUNTER — TELEMEDICINE (OUTPATIENT)
Age: 48
End: 2023-10-18
Payer: MEDICAID

## 2023-10-18 DIAGNOSIS — M54.42 CHRONIC BILATERAL LOW BACK PAIN WITH BILATERAL SCIATICA: Primary | ICD-10-CM

## 2023-10-18 DIAGNOSIS — M54.41 CHRONIC BILATERAL LOW BACK PAIN WITH BILATERAL SCIATICA: Primary | ICD-10-CM

## 2023-10-18 DIAGNOSIS — G89.29 CHRONIC BILATERAL LOW BACK PAIN WITH BILATERAL SCIATICA: Primary | ICD-10-CM

## 2023-10-18 PROCEDURE — 99213 OFFICE O/P EST LOW 20 MIN: CPT | Performed by: NURSE PRACTITIONER

## 2023-10-18 RX ORDER — METHOCARBAMOL 500 MG/1
500 TABLET, FILM COATED ORAL 3 TIMES DAILY PRN
Qty: 60 TABLET | Refills: 1 | Status: SHIPPED | OUTPATIENT
Start: 2023-10-18

## 2023-10-18 NOTE — PROGRESS NOTES
Ashley Rueda, was evaluated through a synchronous (real-time) audio-video encounter. The patient (or guardian if applicable) is aware that this is a billable service, which includes applicable co-pays. This Virtual Visit was conducted with patient's (and/or legal guardian's) consent. Patient identification was verified, and a caregiver was present when appropriate. The patient was located at Home: [address] [place] [address]  Provider was located at Home ([address]): [address] [facility] (:  1975) is a Established patient, presenting virtually for evaluation of the following:    Shereen Flores was seen today for numbness and fall. Diagnoses and all orders for this visit:    Chronic bilateral low back pain with bilateral sciatica    Other orders  -     methocarbamol (ROBAXIN) 500 MG tablet;  Take 1 tablet by mouth 3 times daily as needed (muscle spasms)      Refilled robaxin  Recommended [facility] for eval and tx    Subjective   HPI  She has fallen 3 times due to severe low back pain and sciatica  Wants second opinion for pain and DDD management  Wants to go to [facility]  Needs refill of robaxin    Review of Systems   A comprehensive review of system was obtained and negative except findings in the HPI      Objective   Patient-Reported Vitals  No data recorded     Physical Exam  [INSTRUCTIONS:  \"[x]\" Indicates a positive item  \"[]\" Indicates a negative item  -- DELETE ALL ITEMS NOT EXAMINED]    Constitutional: [x] Appears well-developed and well-nourished [x] No apparent distress      [] Abnormal -     Mental status: [x] Alert and awake  [x] Oriented to person/place/time [x] Able to follow commands    [] Abnormal -     Eyes:   EOM    [x]  Normal    [] Abnormal -   Sclera  [x]  Normal    [] Abnormal -          Discharge [x]  None visible   [] Abnormal -     HENT: [x] Normocephalic, atraumatic  [] Abnormal -   [x] Mouth/Throat: Mucous membranes are moist    External Ears [x] Normal  []

## 2023-10-23 RX ORDER — LOPERAMIDE HYDROCHLORIDE 2 MG/1
CAPSULE ORAL
Qty: 30 CAPSULE | Refills: 1 | Status: SHIPPED | OUTPATIENT
Start: 2023-10-23

## 2023-10-23 RX ORDER — ALBUTEROL SULFATE 90 UG/1
AEROSOL, METERED RESPIRATORY (INHALATION)
Qty: 8.5 G | Refills: 2 | Status: SHIPPED | OUTPATIENT
Start: 2023-10-23

## 2023-10-27 DIAGNOSIS — M54.42 CHRONIC BILATERAL LOW BACK PAIN WITH BILATERAL SCIATICA: ICD-10-CM

## 2023-10-27 DIAGNOSIS — F32.A ANXIETY AND DEPRESSION: ICD-10-CM

## 2023-10-27 DIAGNOSIS — G89.29 CHRONIC BILATERAL LOW BACK PAIN WITH BILATERAL SCIATICA: ICD-10-CM

## 2023-10-27 DIAGNOSIS — M54.41 CHRONIC BILATERAL LOW BACK PAIN WITH BILATERAL SCIATICA: ICD-10-CM

## 2023-10-27 DIAGNOSIS — F41.9 ANXIETY AND DEPRESSION: ICD-10-CM

## 2023-10-29 RX ORDER — BUPROPION HYDROCHLORIDE 150 MG/1
150 TABLET ORAL EVERY MORNING
Qty: 30 TABLET | Refills: 3 | Status: SHIPPED | OUTPATIENT
Start: 2023-10-29

## 2023-10-29 RX ORDER — HYDROCODONE BITARTRATE AND ACETAMINOPHEN 5; 325 MG/1; MG/1
1 TABLET ORAL 2 TIMES DAILY PRN
Qty: 60 TABLET | Refills: 0 | Status: SHIPPED | OUTPATIENT
Start: 2023-10-29 | End: 2023-11-28

## 2023-10-29 RX ORDER — LORAZEPAM 1 MG/1
1 TABLET ORAL 2 TIMES DAILY PRN
Qty: 45 TABLET | Refills: 0 | Status: SHIPPED | OUTPATIENT
Start: 2023-10-29 | End: 2023-11-28

## 2023-10-30 DIAGNOSIS — M54.42 CHRONIC BILATERAL LOW BACK PAIN WITH BILATERAL SCIATICA: ICD-10-CM

## 2023-10-30 DIAGNOSIS — G89.29 CHRONIC BILATERAL LOW BACK PAIN WITH BILATERAL SCIATICA: ICD-10-CM

## 2023-10-30 DIAGNOSIS — M54.41 CHRONIC BILATERAL LOW BACK PAIN WITH BILATERAL SCIATICA: ICD-10-CM

## 2023-10-30 RX ORDER — HYDROCODONE BITARTRATE AND ACETAMINOPHEN 5; 325 MG/1; MG/1
TABLET ORAL
Qty: 60 TABLET | OUTPATIENT
Start: 2023-10-30

## 2023-11-03 ENCOUNTER — TELEPHONE (OUTPATIENT)
Age: 48
End: 2023-11-03

## 2023-11-03 NOTE — TELEPHONE ENCOUNTER
[facility] would like to speak with nurse as to why patient is needing Catheter and what size. Please call [facility] @ [phone] EXT: [phone]. Fax: [phone]

## 2023-11-06 ENCOUNTER — PATIENT MESSAGE (OUTPATIENT)
Age: 48
End: 2023-11-06

## 2023-11-06 NOTE — TELEPHONE ENCOUNTER
Returned call to [facility]. Informed as per [facility]. MyChart note sent to patient that if she does have an indwelling catheter, she needs to order supplies through whoever is managing issue.

## 2023-11-19 DIAGNOSIS — G89.29 CHRONIC BILATERAL LOW BACK PAIN WITH BILATERAL SCIATICA: Primary | ICD-10-CM

## 2023-11-19 DIAGNOSIS — M54.41 CHRONIC BILATERAL LOW BACK PAIN WITH BILATERAL SCIATICA: Primary | ICD-10-CM

## 2023-11-19 DIAGNOSIS — M54.42 CHRONIC BILATERAL LOW BACK PAIN WITH BILATERAL SCIATICA: Primary | ICD-10-CM

## 2023-11-20 RX ORDER — TRAZODONE HYDROCHLORIDE 50 MG/1
50 TABLET ORAL NIGHTLY
Qty: 30 TABLET | Refills: 1 | Status: SHIPPED | OUTPATIENT
Start: 2023-11-20

## 2023-11-20 RX ORDER — LOSARTAN POTASSIUM AND HYDROCHLOROTHIAZIDE 25; 100 MG/1; MG/1
1 TABLET ORAL DAILY
Qty: 30 TABLET | Refills: 1 | Status: SHIPPED | OUTPATIENT
Start: 2023-11-20

## 2023-11-20 RX ORDER — ONDANSETRON 4 MG/1
TABLET, ORALLY DISINTEGRATING ORAL
Qty: 15 TABLET | Refills: 1 | Status: SHIPPED | OUTPATIENT
Start: 2023-11-20

## 2023-11-20 RX ORDER — GABAPENTIN 300 MG/1
300 CAPSULE ORAL 3 TIMES DAILY
Qty: 90 CAPSULE | Refills: 1 | Status: SHIPPED | OUTPATIENT
Start: 2023-11-20 | End: 2023-12-20

## 2023-11-28 DIAGNOSIS — M54.41 CHRONIC BILATERAL LOW BACK PAIN WITH BILATERAL SCIATICA: ICD-10-CM

## 2023-11-28 DIAGNOSIS — F41.9 ANXIETY AND DEPRESSION: ICD-10-CM

## 2023-11-28 DIAGNOSIS — G89.29 CHRONIC BILATERAL LOW BACK PAIN WITH BILATERAL SCIATICA: ICD-10-CM

## 2023-11-28 DIAGNOSIS — M54.42 CHRONIC BILATERAL LOW BACK PAIN WITH BILATERAL SCIATICA: ICD-10-CM

## 2023-11-28 DIAGNOSIS — F32.A ANXIETY AND DEPRESSION: ICD-10-CM

## 2023-11-28 RX ORDER — BUPROPION HYDROCHLORIDE 150 MG/1
150 TABLET ORAL EVERY MORNING
Qty: 30 TABLET | Refills: 3 | OUTPATIENT
Start: 2023-11-28

## 2023-11-28 RX ORDER — HYDROCODONE BITARTRATE AND ACETAMINOPHEN 5; 325 MG/1; MG/1
1 TABLET ORAL 2 TIMES DAILY PRN
Qty: 60 TABLET | Refills: 0 | OUTPATIENT
Start: 2023-11-28 | End: 2023-12-28

## 2023-11-28 RX ORDER — LORAZEPAM 1 MG/1
1 TABLET ORAL 2 TIMES DAILY PRN
Qty: 45 TABLET | Refills: 0 | OUTPATIENT
Start: 2023-11-28 | End: 2023-12-28

## 2023-12-01 ENCOUNTER — TELEPHONE (OUTPATIENT)
Age: 48
End: 2023-12-01

## 2023-12-05 DIAGNOSIS — M54.41 CHRONIC BILATERAL LOW BACK PAIN WITH BILATERAL SCIATICA: ICD-10-CM

## 2023-12-05 DIAGNOSIS — F32.A ANXIETY AND DEPRESSION: Primary | ICD-10-CM

## 2023-12-05 DIAGNOSIS — F41.9 ANXIETY AND DEPRESSION: Primary | ICD-10-CM

## 2023-12-05 DIAGNOSIS — M54.42 CHRONIC BILATERAL LOW BACK PAIN WITH BILATERAL SCIATICA: ICD-10-CM

## 2023-12-05 DIAGNOSIS — G89.29 CHRONIC BILATERAL LOW BACK PAIN WITH BILATERAL SCIATICA: ICD-10-CM

## 2023-12-05 RX ORDER — LORAZEPAM 1 MG/1
1 TABLET ORAL 2 TIMES DAILY PRN
Qty: 60 TABLET | Refills: 1 | Status: SHIPPED | OUTPATIENT
Start: 2023-12-05 | End: 2024-01-04

## 2023-12-05 RX ORDER — GABAPENTIN 300 MG/1
300 CAPSULE ORAL 3 TIMES DAILY
Qty: 90 CAPSULE | Refills: 1 | Status: SHIPPED | OUTPATIENT
Start: 2023-12-05 | End: 2024-01-04

## 2023-12-05 RX ORDER — BUPROPION HYDROCHLORIDE 150 MG/1
150 TABLET ORAL EVERY MORNING
Qty: 30 TABLET | Refills: 3 | Status: SHIPPED | OUTPATIENT
Start: 2023-12-05

## 2023-12-27 ENCOUNTER — TELEMEDICINE (OUTPATIENT)
Age: 48
End: 2023-12-27
Payer: MEDICAID

## 2023-12-27 DIAGNOSIS — M54.42 CHRONIC BILATERAL LOW BACK PAIN WITH BILATERAL SCIATICA: Primary | ICD-10-CM

## 2023-12-27 DIAGNOSIS — G89.29 CHRONIC BILATERAL LOW BACK PAIN WITH BILATERAL SCIATICA: Primary | ICD-10-CM

## 2023-12-27 DIAGNOSIS — M54.41 CHRONIC BILATERAL LOW BACK PAIN WITH BILATERAL SCIATICA: Primary | ICD-10-CM

## 2023-12-27 PROCEDURE — 99213 OFFICE O/P EST LOW 20 MIN: CPT | Performed by: NURSE PRACTITIONER

## 2023-12-27 RX ORDER — HYDROCODONE BITARTRATE AND ACETAMINOPHEN 5; 325 MG/1; MG/1
1 TABLET ORAL 2 TIMES DAILY PRN
Qty: 60 TABLET | Refills: 0 | Status: SHIPPED | OUTPATIENT
Start: 2023-12-27 | End: 2024-01-26

## 2023-12-27 NOTE — PROGRESS NOTES
Natasha Cheney is a 51 y.o. female , id x 2(name and ). Reviewed questionnaires, and  medications. Chief Complaint   Patient presents with    Medication Refill     [place]co       1. Have you been to the ER, urgent care clinic since your last visit? Hospitalized since your last visit? No    2. Have you seen or consulted any other health care providers outside of the [address] [place] [address] since your last visit? Include any pap smears or colon screening.  No

## 2024-01-02 ENCOUNTER — CLINICAL DOCUMENTATION (OUTPATIENT)
Age: 49
End: 2024-01-02

## 2024-01-08 RX ORDER — DULOXETIN HYDROCHLORIDE 60 MG/1
60 CAPSULE, DELAYED RELEASE ORAL 2 TIMES DAILY
Qty: 60 CAPSULE | Refills: 2 | Status: SHIPPED | OUTPATIENT
Start: 2024-01-08

## 2024-01-18 RX ORDER — LOSARTAN POTASSIUM AND HYDROCHLOROTHIAZIDE 25; 100 MG/1; MG/1
1 TABLET ORAL DAILY
Qty: 30 TABLET | Refills: 1 | OUTPATIENT
Start: 2024-01-18